# Patient Record
Sex: MALE | Race: WHITE | NOT HISPANIC OR LATINO | Employment: OTHER | URBAN - METROPOLITAN AREA
[De-identification: names, ages, dates, MRNs, and addresses within clinical notes are randomized per-mention and may not be internally consistent; named-entity substitution may affect disease eponyms.]

---

## 2018-07-09 ENCOUNTER — APPOINTMENT (EMERGENCY)
Dept: CT IMAGING | Facility: HOSPITAL | Age: 68
End: 2018-07-09
Payer: MEDICARE

## 2018-07-09 ENCOUNTER — OFFICE VISIT (OUTPATIENT)
Dept: URGENT CARE | Facility: MEDICAL CENTER | Age: 68
End: 2018-07-09
Payer: MEDICARE

## 2018-07-09 ENCOUNTER — HOSPITAL ENCOUNTER (OUTPATIENT)
Facility: HOSPITAL | Age: 68
Setting detail: OBSERVATION
Discharge: HOME | End: 2018-07-10
Attending: EMERGENCY MEDICINE | Admitting: HOSPITALIST
Payer: MEDICARE

## 2018-07-09 VITALS
WEIGHT: 238.6 LBS | DIASTOLIC BLOOD PRESSURE: 78 MMHG | SYSTOLIC BLOOD PRESSURE: 110 MMHG | RESPIRATION RATE: 20 BRPM | OXYGEN SATURATION: 96 % | HEART RATE: 92 BPM | TEMPERATURE: 98.6 F

## 2018-07-09 DIAGNOSIS — R31.9 HEMATURIA, UNSPECIFIED TYPE: Primary | ICD-10-CM

## 2018-07-09 DIAGNOSIS — N39.0 UTI (URINARY TRACT INFECTION): ICD-10-CM

## 2018-07-09 DIAGNOSIS — N20.0 NEPHROLITHIASIS: ICD-10-CM

## 2018-07-09 DIAGNOSIS — N20.1 OBSTRUCTION OF LEFT URETEROPELVIC JUNCTION (UPJ) DUE TO STONE: Primary | ICD-10-CM

## 2018-07-09 DIAGNOSIS — M71.30 SYNOVIAL CYST: ICD-10-CM

## 2018-07-09 PROBLEM — K21.9 GERD (GASTROESOPHAGEAL REFLUX DISEASE): Status: ACTIVE | Noted: 2018-07-09

## 2018-07-09 PROBLEM — E11.9 TYPE 2 DIABETES MELLITUS (HCC): Status: ACTIVE | Noted: 2018-07-09

## 2018-07-09 PROBLEM — G47.33 OSA (OBSTRUCTIVE SLEEP APNEA): Status: ACTIVE | Noted: 2018-07-09

## 2018-07-09 PROBLEM — I82.409 DEEP VEIN THROMBOSIS (DVT) DURING CURRENT HOSPITALIZATION (HCC): Status: ACTIVE | Noted: 2018-07-09

## 2018-07-09 PROBLEM — I48.0 PAROXYSMAL A-FIB (HCC): Status: ACTIVE | Noted: 2018-07-09

## 2018-07-09 PROBLEM — R82.90 URINE ABNORMALITY: Status: ACTIVE | Noted: 2018-07-09

## 2018-07-09 PROBLEM — I71.2 THORACIC AORTIC ANEURYSM (HCC): Status: ACTIVE | Noted: 2018-07-09

## 2018-07-09 LAB
ALBUMIN SERPL BCP-MCNC: 3.5 G/DL (ref 3.5–5)
ALP SERPL-CCNC: 107 U/L (ref 46–116)
ALT SERPL W P-5'-P-CCNC: 24 U/L (ref 12–78)
ANION GAP SERPL CALCULATED.3IONS-SCNC: 12 MMOL/L (ref 4–13)
APTT PPP: 36 SECONDS (ref 24–36)
AST SERPL W P-5'-P-CCNC: 18 U/L (ref 5–45)
BACTERIA UR QL AUTO: ABNORMAL /HPF
BASOPHILS # BLD AUTO: 0.03 THOUSANDS/ΜL (ref 0–0.1)
BASOPHILS NFR BLD AUTO: 0 % (ref 0–1)
BILIRUB SERPL-MCNC: 1.3 MG/DL (ref 0.2–1)
BILIRUB UR QL STRIP: ABNORMAL
BUN SERPL-MCNC: 23 MG/DL (ref 5–25)
CALCIUM SERPL-MCNC: 8.6 MG/DL (ref 8.3–10.1)
CHLORIDE SERPL-SCNC: 106 MMOL/L (ref 100–108)
CLARITY UR: ABNORMAL
CO2 SERPL-SCNC: 22 MMOL/L (ref 21–32)
COLOR UR: ABNORMAL
CREAT SERPL-MCNC: 1.2 MG/DL (ref 0.6–1.3)
EOSINOPHIL # BLD AUTO: 0.1 THOUSAND/ΜL (ref 0–0.61)
EOSINOPHIL NFR BLD AUTO: 1 % (ref 0–6)
ERYTHROCYTE [DISTWIDTH] IN BLOOD BY AUTOMATED COUNT: 13 % (ref 11.6–15.1)
GFR SERPL CREATININE-BSD FRML MDRD: 62 ML/MIN/1.73SQ M
GLUCOSE SERPL-MCNC: 141 MG/DL (ref 65–140)
GLUCOSE SERPL-MCNC: 263 MG/DL (ref 65–140)
GLUCOSE UR STRIP-MCNC: NEGATIVE MG/DL
HCT VFR BLD AUTO: 47.7 % (ref 36.5–49.3)
HGB BLD-MCNC: 16.4 G/DL (ref 12–17)
HGB UR QL STRIP.AUTO: ABNORMAL
INR PPP: 1.83 (ref 0.86–1.17)
KETONES UR STRIP-MCNC: NEGATIVE MG/DL
LEUKOCYTE ESTERASE UR QL STRIP: NEGATIVE
LYMPHOCYTES # BLD AUTO: 2.41 THOUSANDS/ΜL (ref 0.6–4.47)
LYMPHOCYTES NFR BLD AUTO: 30 % (ref 14–44)
MCH RBC QN AUTO: 30.9 PG (ref 26.8–34.3)
MCHC RBC AUTO-ENTMCNC: 34.4 G/DL (ref 31.4–37.4)
MCV RBC AUTO: 90 FL (ref 82–98)
MONOCYTES # BLD AUTO: 0.69 THOUSAND/ΜL (ref 0.17–1.22)
MONOCYTES NFR BLD AUTO: 9 % (ref 4–12)
NEUTROPHILS # BLD AUTO: 4.81 THOUSANDS/ΜL (ref 1.85–7.62)
NEUTS SEG NFR BLD AUTO: 60 % (ref 43–75)
NITRITE UR QL STRIP: POSITIVE
NON-SQ EPI CELLS URNS QL MICRO: ABNORMAL /HPF
PH UR STRIP.AUTO: 5 [PH] (ref 4.5–8)
PLATELET # BLD AUTO: 199 THOUSANDS/UL (ref 149–390)
PMV BLD AUTO: 9.7 FL (ref 8.9–12.7)
POTASSIUM SERPL-SCNC: 4.2 MMOL/L (ref 3.5–5.3)
PROT SERPL-MCNC: 7.5 G/DL (ref 6.4–8.2)
PROT UR STRIP-MCNC: ABNORMAL MG/DL
PROTHROMBIN TIME: 20.6 SECONDS (ref 11.8–14.2)
RBC # BLD AUTO: 5.3 MILLION/UL (ref 3.88–5.62)
RBC #/AREA URNS AUTO: ABNORMAL /HPF
SL AMB  POCT GLUCOSE, UA: ABNORMAL
SL AMB LEUKOCYTE ESTERASE,UA: ABNORMAL
SL AMB POCT BILIRUBIN,UA: ABNORMAL
SL AMB POCT BLOOD,UA: ABNORMAL
SL AMB POCT CLARITY,UA: ABNORMAL
SL AMB POCT COLOR,UA: ABNORMAL
SL AMB POCT KETONES,UA: ABNORMAL
SL AMB POCT NITRITE,UA: ABNORMAL
SL AMB POCT PH,UA: 6
SL AMB POCT SPECIFIC GRAVITY,UA: 1.03
SL AMB POCT URINE PROTEIN: 100
SL AMB POCT UROBILINOGEN: 0.2
SODIUM SERPL-SCNC: 140 MMOL/L (ref 136–145)
SP GR UR STRIP.AUTO: >=1.03 (ref 1–1.03)
UROBILINOGEN UR QL STRIP.AUTO: 0.2 E.U./DL
WBC # BLD AUTO: 8.04 THOUSAND/UL (ref 4.31–10.16)
WBC #/AREA URNS AUTO: ABNORMAL /HPF

## 2018-07-09 PROCEDURE — 99203 OFFICE O/P NEW LOW 30 MIN: CPT | Performed by: PHYSICIAN ASSISTANT

## 2018-07-09 PROCEDURE — 96361 HYDRATE IV INFUSION ADD-ON: CPT

## 2018-07-09 PROCEDURE — 99219 PR INITIAL OBSERVATION CARE/DAY 50 MINUTES: CPT | Performed by: PHYSICIAN ASSISTANT

## 2018-07-09 PROCEDURE — 74177 CT ABD & PELVIS W/CONTRAST: CPT

## 2018-07-09 PROCEDURE — 81001 URINALYSIS AUTO W/SCOPE: CPT | Performed by: EMERGENCY MEDICINE

## 2018-07-09 PROCEDURE — 85025 COMPLETE CBC W/AUTO DIFF WBC: CPT | Performed by: EMERGENCY MEDICINE

## 2018-07-09 PROCEDURE — 87086 URINE CULTURE/COLONY COUNT: CPT | Performed by: EMERGENCY MEDICINE

## 2018-07-09 PROCEDURE — 85610 PROTHROMBIN TIME: CPT | Performed by: EMERGENCY MEDICINE

## 2018-07-09 PROCEDURE — 96360 HYDRATION IV INFUSION INIT: CPT

## 2018-07-09 PROCEDURE — 85730 THROMBOPLASTIN TIME PARTIAL: CPT | Performed by: EMERGENCY MEDICINE

## 2018-07-09 PROCEDURE — 99285 EMERGENCY DEPT VISIT HI MDM: CPT

## 2018-07-09 PROCEDURE — 82948 REAGENT STRIP/BLOOD GLUCOSE: CPT

## 2018-07-09 PROCEDURE — 36415 COLL VENOUS BLD VENIPUNCTURE: CPT | Performed by: EMERGENCY MEDICINE

## 2018-07-09 PROCEDURE — 80053 COMPREHEN METABOLIC PANEL: CPT | Performed by: EMERGENCY MEDICINE

## 2018-07-09 PROCEDURE — G0463 HOSPITAL OUTPT CLINIC VISIT: HCPCS | Performed by: PHYSICIAN ASSISTANT

## 2018-07-09 RX ORDER — WARFARIN SODIUM 10 MG/1
10 TABLET ORAL 2 TIMES WEEKLY
COMMUNITY

## 2018-07-09 RX ORDER — SODIUM CHLORIDE 9 MG/ML
125 INJECTION, SOLUTION INTRAVENOUS CONTINUOUS
Status: DISCONTINUED | OUTPATIENT
Start: 2018-07-09 | End: 2018-07-10 | Stop reason: HOSPADM

## 2018-07-09 RX ORDER — POLYETHYLENE GLYCOL 3350 17 G/17G
17 POWDER, FOR SOLUTION ORAL DAILY PRN
Status: DISCONTINUED | OUTPATIENT
Start: 2018-07-09 | End: 2018-07-10 | Stop reason: HOSPADM

## 2018-07-09 RX ORDER — GLIPIZIDE 5 MG/1
5 TABLET ORAL EVERY MORNING
COMMUNITY

## 2018-07-09 RX ORDER — LEVOFLOXACIN 5 MG/ML
750 INJECTION, SOLUTION INTRAVENOUS EVERY 24 HOURS
Status: DISCONTINUED | OUTPATIENT
Start: 2018-07-10 | End: 2018-07-10 | Stop reason: HOSPADM

## 2018-07-09 RX ORDER — ONDANSETRON 2 MG/ML
4 INJECTION INTRAMUSCULAR; INTRAVENOUS EVERY 6 HOURS PRN
Status: DISCONTINUED | OUTPATIENT
Start: 2018-07-09 | End: 2018-07-10 | Stop reason: HOSPADM

## 2018-07-09 RX ORDER — METOPROLOL SUCCINATE 25 MG/1
25 TABLET, EXTENDED RELEASE ORAL
COMMUNITY

## 2018-07-09 RX ORDER — ACETAMINOPHEN 325 MG/1
650 TABLET ORAL EVERY 6 HOURS PRN
Status: DISCONTINUED | OUTPATIENT
Start: 2018-07-09 | End: 2018-07-10 | Stop reason: HOSPADM

## 2018-07-09 RX ORDER — LEVOFLOXACIN 5 MG/ML
750 INJECTION, SOLUTION INTRAVENOUS ONCE
Status: COMPLETED | OUTPATIENT
Start: 2018-07-09 | End: 2018-07-09

## 2018-07-09 RX ORDER — METOPROLOL SUCCINATE 25 MG/1
25 TABLET, EXTENDED RELEASE ORAL DAILY
Status: DISCONTINUED | OUTPATIENT
Start: 2018-07-10 | End: 2018-07-10 | Stop reason: HOSPADM

## 2018-07-09 RX ORDER — SODIUM CHLORIDE 9 MG/ML
125 INJECTION, SOLUTION INTRAVENOUS CONTINUOUS
Status: DISCONTINUED | OUTPATIENT
Start: 2018-07-09 | End: 2018-07-09 | Stop reason: HOSPADM

## 2018-07-09 RX ORDER — VALSARTAN 80 MG/1
40 TABLET ORAL DAILY
Status: DISCONTINUED | OUTPATIENT
Start: 2018-07-10 | End: 2018-07-10 | Stop reason: HOSPADM

## 2018-07-09 RX ORDER — WARFARIN SODIUM 5 MG/1
5 TABLET ORAL
COMMUNITY

## 2018-07-09 RX ORDER — LOSARTAN POTASSIUM 25 MG/1
25 TABLET ORAL EVERY MORNING
COMMUNITY
End: 2018-07-26

## 2018-07-09 RX ADMIN — SODIUM CHLORIDE 1000 ML: 0.9 INJECTION, SOLUTION INTRAVENOUS at 19:22

## 2018-07-09 RX ADMIN — LEVOFLOXACIN 750 MG: 5 INJECTION, SOLUTION INTRAVENOUS at 21:49

## 2018-07-09 RX ADMIN — IOHEXOL 100 ML: 350 INJECTION, SOLUTION INTRAVENOUS at 20:01

## 2018-07-09 RX ADMIN — SODIUM CHLORIDE 125 ML/HR: 0.9 INJECTION, SOLUTION INTRAVENOUS at 21:49

## 2018-07-09 NOTE — PROGRESS NOTES
[address]        NAME: López Kaur is a 80 y o  male  : 1950    MRN: [id number]  DATE: 2018  TIME: 5:51 PM    Assessment and Plan   Hematuria, unspecified type [R31 9]  1  Hematuria, unspecified type           Patient Instructions     Patient Instructions   Patient sent to the ER for further evaluation workup could not be performed our facility  Patient needs PT INR  Patient is stable to go by car  Patient is going to [facility]  Follow up with PCP in 3-5 days  Proceed to  ER if symptoms worsen  Chief Complaint     Chief Complaint   Patient presents with    Possible UTI    Testicle Pain         History of Present Illness       This is a 67-year-old male complaining of brown colored urine x8 hours  Patient reports his last INR was 3 weeks ago and it was 2 7  Patient reports he has not changed his dose of Coumadin in the past 3 weeks  Patient denies any bleeding of nose mouth or any other orifices  Patient denies any chest pain shortness of breath difficulty breathing, painful urination or discharge  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 with similar color urine before widening  Testicle Pain   Pertinent negatives include no chest pain, chills, fever, rash or shortness of breath  Review of Systems   Review of Systems   Constitutional: Negative for chills and fever  HENT: Negative  Eyes: Negative  Respiratory: Negative for chest tightness, shortness of breath and wheezing  Cardiovascular: Negative for chest pain and palpitations  Genitourinary:        Brown color urine     Skin: Negative for rash           Current Medications       Current Outpatient Prescriptions:     losartan (COZAAR) 25 mg tablet, Take 25 mg by mouth daily, Disp: , Rfl:     metoprolol succinate (TOPROL-XL) 25 mg 24 hr tablet, Take 25 mg by mouth daily, Disp: , Rfl:     warfarin (COUMADIN) 10 mg tablet, Take 10 mg by mouth 2 (two) times a week, Disp: , Rfl:    warfarin (COUMADIN) 5 mg tablet, Take 5 mg by mouth 4 (four) times a week, Disp: , Rfl:     Current Allergies     Allergies as of 07/09/2018 - Reviewed 07/09/2018   Allergen Reaction Noted    Penicillins  07/09/2018            The following portions of the patient's history were reviewed and updated as appropriate: allergies, current medications, past family history, past medical history, past social history, past surgical history and problem list      Past Medical History:   Diagnosis Date    [facility]        No past surgical history on file  No family history on file  Medications have been verified  Objective   /78 (BP Location: Left arm, Patient Position: Sitting, Cuff Size: Large)   Pulse 92   Temp 98 6 °F (37 °C) (Temporal)   Resp 20   Wt 108 kg (238 lb 9 6 oz)   SpO2 96%        Physical Exam     Physical Exam   Constitutional: He is oriented to person, place, and time  He appears well-developed and well-nourished  HENT:   Right Ear: Tympanic membrane and external ear normal    Left Ear: Tympanic membrane and external ear normal    Eyes: EOM are normal    Neck: Normal range of motion  No edema present  Cardiovascular: Normal rate, regular rhythm, S1 normal, S2 normal and normal heart sounds  No murmur heard  Pulmonary/Chest: Effort normal and breath sounds normal  No respiratory distress  He has no wheezes  He has no rales  He exhibits no tenderness  Lymphadenopathy:     He has no cervical adenopathy  Neurological: He is alert and oriented to person, place, and time  Skin: Skin is warm, dry and intact  No rash noted  Psychiatric: He has a normal mood and affect  His speech is normal and behavior is normal    Nursing note and vitals reviewed

## 2018-07-09 NOTE — ED PROVIDER NOTES
History  Chief Complaint   Patient presents with    Blood in Urine     Pt presents to the ED for evaluation of "brown urine" since today, pt gave sample to   Pt reports small blood in urine 1 month ago but did not follow up with urology  pt takes coumadin for Afib  Pt denies urinary symptoms or pain     28-year-old male presents from urgent care for evaluation of dark brown tea-colored urine  Patient noticed change in urine color approximately 8 to 10 hours ago  Patient states that he does void frequently and urgently at baseline  He has not noticed new urgency or frequency  Patient does take Coumadin  He had an INR checked approximately 3 weeks ago it was 2 7 at that time  He has a history of atrial fibrillation  Patient denies dysuria  No flank pain  Patient was found to have microscopic hematuria by his PCP a few weeks ago  He was referred to Urology but did not follow up yet because he had to come back to [place] because his father is dying  Patient resides in [place]  Patient denies fevers or chills  No nausea, vomiting, diarrhea  No history of liver disease  No history of renal disease  Patient does have a thoracic aortic aneurysm that is being followed by his cardiologist         History provided by:  Patient and medical records   used: No    Blood in Urine   This is a new problem  The current episode started today  The problem has been gradually worsening since onset  Severity: Brown colored urine with sediment, no clots  The hematuria occurs throughout his entire urinary stream  He reports no clotting in his urine stream  His pain is at a severity of 0/10  He is experiencing no pain  He describes his urine color as tea colored  Irritative symptoms include frequency and urgency  Obstructive symptoms do not include incomplete emptying or straining  Pertinent negatives include no abdominal pain, chills, flank pain, nausea or vomiting         Prior to Admission Medications   Prescriptions Last Dose Informant Patient Reported? Taking?   glipiZIDE (GLUCOTROL) 5 mg tablet Past Week at Unknown time  Yes Yes   Sig: Take 5 mg by mouth daily   losartan (COZAAR) 25 mg tablet Past Week at Unknown time  Yes Yes   Sig: Take 25 mg by mouth daily   metoprolol succinate (TOPROL-XL) 25 mg 24 hr tablet Past Week at Unknown time  Yes Yes   Sig: Take 25 mg by mouth daily   warfarin (COUMADIN) 10 mg tablet Past Week at Unknown time  Yes Yes   Sig: Take 10 mg by mouth 2 (two) times a week   warfarin (COUMADIN) 5 mg tablet 7/9/2018 at Unknown time  Yes Yes   Sig: Take 5 mg by mouth 4 (four) times a week      Facility-Administered Medications: None       Past Medical History:   Diagnosis Date    [facility])     [facility] ([facility] )        Past Surgical History:   Procedure Laterality Date    NM CYSTOURETHROSCOPY,URETER CATHETER Left 7/10/2018    Procedure: CYSTOSCOPY RETROGRADE PYELOGRAM WITH INSERTION STENT URETERAL;  Surgeon: Nina Elias MD;  Location: [facility];  Service: Urology       History reviewed  No pertinent family history  I have reviewed and agree with the history as documented  Social History   Substance Use Topics    Smoking status: Never Smoker    Smokeless tobacco: Never Used    Alcohol use Yes      Comment: occ        Review of Systems   Constitutional: Negative for chills  Gastrointestinal: Negative for abdominal pain, nausea and vomiting  Genitourinary: Positive for frequency, hematuria and urgency  Negative for flank pain and incomplete emptying  All other systems reviewed and are negative  Physical Exam  Physical Exam   Constitutional: He is oriented to person, place, and time  Vital signs are normal  He appears well-developed and well-nourished  No distress  HENT:   Head: Normocephalic and atraumatic  Eyes: Conjunctivae and EOM are normal  Pupils are equal, round, and reactive to light  Neck: Normal range of motion  Neck supple  Cardiovascular: Normal rate, regular rhythm, normal heart sounds and intact distal pulses  Pulmonary/Chest: Effort normal and breath sounds normal  No accessory muscle usage  No respiratory distress  He exhibits no tenderness  Abdominal: Soft  Normal appearance and bowel sounds are normal  He exhibits no distension  There is tenderness in the left lower quadrant  There is no rebound, no guarding and no CVA tenderness  Obese abdomen   Musculoskeletal: Normal range of motion  He exhibits no edema, tenderness or deformity  Lymphadenopathy:     He has no cervical adenopathy  Neurological: He is alert and oriented to person, place, and time  He has normal strength and normal reflexes  Coordination normal    Skin: Skin is warm, dry and intact  No rash noted  Psychiatric: He has a normal mood and affect  His behavior is normal  Judgment and thought content normal    Vitals reviewed  Vital Signs  ED Triage Vitals [07/09/18 1830]   Temperature Pulse Respirations Blood Pressure SpO2   98 2 °F (36 8 °C) 98 20 130/81 96 %      Temp Source Heart Rate Source Patient Position - Orthostatic VS BP Location FiO2 (%)   Oral Monitor Sitting Right arm --      Pain Score       No Pain           Vitals:    07/10/18 0815 07/10/18 0830 07/10/18 0839 07/10/18 0855   BP: 122/77 122/71 142/72 151/79   Pulse: 70 68 72 71   Patient Position - Orthostatic VS:    Lying       Visual Acuity      ED Medications  Medications   sodium chloride 0 9 % bolus 1,000 mL (0 mL Intravenous Stopped 7/9/18 2128)   iohexol (OMNIPAQUE) 350 MG/ML injection (MULTI-DOSE) 100 mL (100 mL Intravenous Given 7/9/18 2001)   levofloxacin (LEVAQUIN) IVPB (premix) 750 mg (750 mg Intravenous New Bag 7/9/18 2149)       Diagnostic Studies  Results Reviewed     Procedure Component Value Units Date/Time    Urine culture [[id number]] Collected:  07/09/18 2141    Lab Status:   In process Specimen:  Urine from Urine, Clean Catch Updated:  07/09/18 2149    Urine Microscopic [[id number]]  (Abnormal) Collected:  07/09/18 1919    Lab Status:  Final result Specimen:  Urine from Urine, Clean Catch Updated:  07/09/18 1954     RBC, UA       Field obscured, unable to enumerate (A)     /hpf     WBC, UA 1-2 (A) /hpf      Epithelial Cells Occasional /hpf      Bacteria, UA Occasional /hpf     UA w Reflex to Microscopic w Reflex to Culture [[id number]]  (Abnormal) Collected:  07/09/18 1919    Lab Status:  Final result Specimen:  Urine from Urine, Clean Catch Updated:  07/09/18 1948     Color, UA Red     Clarity, UA Cloudy     Specific Gravity, UA >=1 030     pH, UA 5 0     Leukocytes, UA Negative     Nitrite, UA Positive (A)     Protein,  (2+) (A) mg/dl      Glucose, UA Negative mg/dl      Ketones, UA Negative mg/dl      Urobilinogen, UA 0 2 E U /dl      Bilirubin, UA Small (A)     Blood, UA Large (A)    Protime-INR [[id number]]  (Abnormal) Collected:  07/09/18 1918    Lab Status:  Final result Specimen:  Blood from Arm, Left Updated:  07/09/18 1948     Protime 20 6 (H) seconds      INR 1 83 (H)    APTT [[id number]]  (Normal) Collected:  07/09/18 1918    Lab Status:  Final result Specimen:  Blood from Arm, Left Updated:  07/09/18 1948     PTT 36 seconds     Comprehensive metabolic panel [[id number]]  (Abnormal) Collected:  07/09/18 1918    Lab Status:  Final result Specimen:  Blood from Arm, Left Updated:  07/09/18 1947     Sodium 140 mmol/L      Potassium 4 2 mmol/L      Chloride 106 mmol/L      CO2 22 mmol/L      Anion Gap 12 mmol/L      BUN 23 mg/dL      Creatinine 1 20 mg/dL      Glucose 141 (H) mg/dL      Calcium 8 6 mg/dL      AST 18 U/L      ALT 24 U/L      Alkaline Phosphatase 107 U/L      Total Protein 7 5 g/dL      Albumin 3 5 g/dL      Total Bilirubin 1 30 (H) mg/dL      eGFR 62 ml/min/1 73sq m     Narrative:         National Kidney Disease Education Program recommendations are as follows:  GFR calculation is accurate only with a steady state creatinine  Chronic Kidney disease less than 60 ml/min/1 73 sq  meters  Kidney failure less than 15 ml/min/1 73 sq  meters  CBC and differential [[id number]]  (Normal) Collected:  07/09/18 1918    Lab Status:  Final result Specimen:  Blood from Arm, Left Updated:  07/09/18 1931     WBC 8 04 Thousand/uL      RBC 5 30 Million/uL      Hemoglobin 16 4 g/dL      Hematocrit 47 7 %      MCV 90 fL      MCH 30 9 pg      MCHC 34 4 g/dL      RDW 13 0 %      MPV 9 7 fL      Platelets 967 Thousands/uL      Neutrophils Relative 60 %      Lymphocytes Relative 30 %      Monocytes Relative 9 %      Eosinophils Relative 1 %      Basophils Relative 0 %      Neutrophils Absolute 4 81 Thousands/µL      Lymphocytes Absolute 2 41 Thousands/µL      Monocytes Absolute 0 69 Thousand/µL      Eosinophils Absolute 0 10 Thousand/µL      Basophils Absolute 0 03 Thousands/µL                  CT abdomen pelvis with contrast   Final Result by oCrry Goode DO (07/09 2041)   Ascending thoracic aortic aneurysm (2 5 cm)      Fatty infiltration of liver  Cholelithiasis without CT evidence of cholecystitis  Diffuse fatty atrophy of pancreas  Bilateral renal cortical cystic lesions, many of which are too small to accurately characterize  Left side renal collecting system stones and a 9 x 6 x 3 mm stone in the left UPJ causing at least partial obstruction with a delayed nephrogram and hydronephrosis  Urologic consultation recommended  Limited evaluation of GI tract without oral contrast   No obstruction or perforation  Colonic diverticulosis without evidence of diverticulitis  Ectasia of the abdominal aorta at the level of the MICHAEL (2 8 cm)  Multilevel fusion of lumbar spine  There is likely a large synovial cyst in the sacral spinal canal   Nonemergent MRI correlation recommended, especially if there are neurologic symptoms           Workstation performed: [id number]         FL retrograde pyelogram    (Results Pending) Procedures  Procedures       Phone Contacts  ED Phone Contact    ED Course                               MDM  Number of Diagnoses or Management Options  Obstruction of left ureteropelvic junction (UPJ) due to stone: new and requires workup  Synovial cyst: new and requires workup  UTI (urinary tract infection): new and requires workup     Amount and/or Complexity of Data Reviewed  Clinical lab tests: ordered and reviewed  Tests in the radiology section of CPT®: ordered and reviewed  Decide to obtain previous medical records or to obtain history from someone other than the patient: yes  Discuss the patient with other providers: yes (Dr Cristian Majano [facility] - will see in consult)  Independent visualization of images, tracings, or specimens: yes    Patient Progress  Patient progress: stable    CritCare Time    Disposition  Final diagnoses:   Obstruction of left ureteropelvic junction (UPJ) due to stone   UTI (urinary tract infection)   Synovial cyst - sacral canal     Time reflects when diagnosis was documented in both MDM as applicable and the Disposition within this note     Time User Action Codes Description Comment    7/9/2018  9:25 PM Corry Moses Add [N20 1] Obstruction of left ureteropelvic junction (UPJ) due to stone     7/9/2018  9:26 PM Corry Moses Add [N39 0] UTI (urinary tract infection)     7/10/2018  7:29 AM Vanessa Ramos Add [N20 0] Nephrolithiasis     7/10/2018  7:58 AM Sima Cristina Modify [N20 1] Obstruction of left ureteropelvic junction (UPJ) due to stone     7/10/2018  2:12 PM Pham Cortés Modify [N20 1] Obstruction of left ureteropelvic junction (UPJ) due to stone     7/11/2018 12:13 AM Corry Moses Add [M71 30] Synovial cyst     7/11/2018 12:13 AM Corry Moses Modify [M71 30] Synovial cyst sacral canal      ED Disposition     ED Disposition Condition Comment    Admit  Case was discussed with Dr Sandra Calix and the patient's admission status was agreed to be Admission Status: inpatient status to the service of Dr Flores Holding   Follow-up Information     Follow up With Specialties Details Why Contact Info    Jelani Parrish MD Urology Follow up on 7/24/2018  [address] [facility]            Discharge Medication List as of 7/10/2018  2:18 PM      START taking these medications    Details   docusate sodium (COLACE) 100 mg capsule Take 1 capsule (100 mg total) by mouth 3 (three) times a day for 30 days, Starting Tue 7/10/2018, Until Thu 8/9/2018, Print         CONTINUE these medications which have CHANGED    Details   nitrofurantoin (MACROBID) 100 mg capsule Take 1 capsule (100 mg total) by mouth 2 (two) times a day for 5 days, Starting Tue 7/10/2018, Until Sun 7/15/2018, Print      oxybutynin (DITROPAN) 5 mg tablet Take 1 tablet (5 mg total) by mouth 2 (two) times a day for 90 days, Starting Tue 7/10/2018, Until Mon 10/8/2018, Print      oxyCODONE-acetaminophen (PERCOCET) 5-325 mg per tablet Take 2 tablets by mouth every 6 (six) hours as needed for moderate pain for up to 10 days Max Daily Amount: 8 tablets, Starting Tue 7/10/2018, Until Fri 7/20/2018, Print      tamsulosin (FLOMAX) 0 4 mg Take 1 capsule (0 4 mg total) by mouth daily with dinner, Starting Tue 7/10/2018, Print         CONTINUE these medications which have NOT CHANGED    Details   glipiZIDE (GLUCOTROL) 5 mg tablet Take 5 mg by mouth daily, Historical Med      losartan (COZAAR) 25 mg tablet Take 25 mg by mouth daily, Historical Med      metoprolol succinate (TOPROL-XL) 25 mg 24 hr tablet Take 25 mg by mouth daily, Historical Med      !! warfarin (COUMADIN) 10 mg tablet Take 10 mg by mouth 2 (two) times a week, Historical Med      !! warfarin (COUMADIN) 5 mg tablet Take 5 mg by mouth 4 (four) times a week, Historical Med       !! - Potential duplicate medications found  Please discuss with provider            Outpatient Discharge Orders  Discharge Diet     Activity as tolerated     Call provider for:  severe uncontrolled pain     Call provider for:         ED Provider  Electronically Signed by           Alonso Holden DO  07/11/18 4836

## 2018-07-09 NOTE — PROGRESS NOTES
Pt states his urine is brown today  Also c/o left testicle discomfort  Pt prescribed Losartan 25 mg on 5-24-18  Pt has A-fib with an irregular heart beat and is on Warfarin

## 2018-07-09 NOTE — PATIENT INSTRUCTIONS
Patient sent to the ER for further evaluation workup could not be performed our facility  Patient needs PT INR  Patient is stable to go by car  Patient is going to [facility]

## 2018-07-10 ENCOUNTER — ANESTHESIA (OUTPATIENT)
Dept: PERIOP | Facility: HOSPITAL | Age: 68
End: 2018-07-10
Payer: MEDICARE

## 2018-07-10 ENCOUNTER — TELEPHONE (OUTPATIENT)
Dept: UROLOGY | Facility: CLINIC | Age: 68
End: 2018-07-10

## 2018-07-10 ENCOUNTER — PREP FOR PROCEDURE (OUTPATIENT)
Dept: UROLOGY | Facility: CLINIC | Age: 68
End: 2018-07-10

## 2018-07-10 ENCOUNTER — APPOINTMENT (OUTPATIENT)
Dept: RADIOLOGY | Facility: HOSPITAL | Age: 68
End: 2018-07-10
Payer: MEDICARE

## 2018-07-10 ENCOUNTER — ANESTHESIA EVENT (OUTPATIENT)
Dept: PERIOP | Facility: HOSPITAL | Age: 68
End: 2018-07-10
Payer: MEDICARE

## 2018-07-10 VITALS
HEART RATE: 71 BPM | RESPIRATION RATE: 19 BRPM | BODY MASS INDEX: 35.25 KG/M2 | OXYGEN SATURATION: 95 % | SYSTOLIC BLOOD PRESSURE: 151 MMHG | DIASTOLIC BLOOD PRESSURE: 79 MMHG | HEIGHT: 69 IN | WEIGHT: 238 LBS | TEMPERATURE: 97.9 F

## 2018-07-10 DIAGNOSIS — N20.0 NEPHROLITHIASIS: Primary | ICD-10-CM

## 2018-07-10 PROBLEM — N20.1 OBSTRUCTION OF LEFT URETEROPELVIC JUNCTION (UPJ) DUE TO STONE: Status: ACTIVE | Noted: 2018-07-09

## 2018-07-10 LAB
ANION GAP SERPL CALCULATED.3IONS-SCNC: 11 MMOL/L (ref 4–13)
APTT PPP: 37 SECONDS (ref 24–36)
ATRIAL RATE: 76 BPM
BASOPHILS # BLD AUTO: 0.01 THOUSANDS/ΜL (ref 0–0.1)
BASOPHILS NFR BLD AUTO: 0 % (ref 0–1)
BUN SERPL-MCNC: 22 MG/DL (ref 5–25)
CALCIUM SERPL-MCNC: 7.9 MG/DL (ref 8.3–10.1)
CHLORIDE SERPL-SCNC: 108 MMOL/L (ref 100–108)
CO2 SERPL-SCNC: 21 MMOL/L (ref 21–32)
CREAT SERPL-MCNC: 1.08 MG/DL (ref 0.6–1.3)
EOSINOPHIL # BLD AUTO: 0.11 THOUSAND/ΜL (ref 0–0.61)
EOSINOPHIL NFR BLD AUTO: 2 % (ref 0–6)
ERYTHROCYTE [DISTWIDTH] IN BLOOD BY AUTOMATED COUNT: 12.9 % (ref 11.6–15.1)
EST. AVERAGE GLUCOSE BLD GHB EST-MCNC: 163 MG/DL
GFR SERPL CREATININE-BSD FRML MDRD: 71 ML/MIN/1.73SQ M
GLUCOSE SERPL-MCNC: 146 MG/DL (ref 65–140)
GLUCOSE SERPL-MCNC: 154 MG/DL (ref 65–140)
GLUCOSE SERPL-MCNC: 186 MG/DL (ref 65–140)
HBA1C MFR BLD: 7.3 % (ref 4.2–6.3)
HCT VFR BLD AUTO: 44.3 % (ref 36.5–49.3)
HGB BLD-MCNC: 15.2 G/DL (ref 12–17)
INR PPP: 1.91 (ref 0.86–1.17)
LYMPHOCYTES # BLD AUTO: 2.29 THOUSANDS/ΜL (ref 0.6–4.47)
LYMPHOCYTES NFR BLD AUTO: 34 % (ref 14–44)
MCH RBC QN AUTO: 30.9 PG (ref 26.8–34.3)
MCHC RBC AUTO-ENTMCNC: 34.3 G/DL (ref 31.4–37.4)
MCV RBC AUTO: 90 FL (ref 82–98)
MONOCYTES # BLD AUTO: 0.7 THOUSAND/ΜL (ref 0.17–1.22)
MONOCYTES NFR BLD AUTO: 10 % (ref 4–12)
NEUTROPHILS # BLD AUTO: 3.6 THOUSANDS/ΜL (ref 1.85–7.62)
NEUTS SEG NFR BLD AUTO: 54 % (ref 43–75)
P AXIS: 34 DEGREES
PLATELET # BLD AUTO: 180 THOUSANDS/UL (ref 149–390)
PMV BLD AUTO: 9.2 FL (ref 8.9–12.7)
POTASSIUM SERPL-SCNC: 4.4 MMOL/L (ref 3.5–5.3)
PR INTERVAL: 182 MS
PROTHROMBIN TIME: 21.3 SECONDS (ref 11.8–14.2)
QRS AXIS: 10 DEGREES
QRSD INTERVAL: 86 MS
QT INTERVAL: 422 MS
QTC INTERVAL: 465 MS
RBC # BLD AUTO: 4.92 MILLION/UL (ref 3.88–5.62)
SODIUM SERPL-SCNC: 140 MMOL/L (ref 136–145)
T WAVE AXIS: 65 DEGREES
VENTRICULAR RATE: 73 BPM
WBC # BLD AUTO: 6.71 THOUSAND/UL (ref 4.31–10.16)

## 2018-07-10 PROCEDURE — 80048 BASIC METABOLIC PNL TOTAL CA: CPT | Performed by: PHYSICIAN ASSISTANT

## 2018-07-10 PROCEDURE — 82948 REAGENT STRIP/BLOOD GLUCOSE: CPT

## 2018-07-10 PROCEDURE — 93010 ELECTROCARDIOGRAM REPORT: CPT | Performed by: INTERNAL MEDICINE

## 2018-07-10 PROCEDURE — 99217 PR OBSERVATION CARE DISCHARGE MANAGEMENT: CPT | Performed by: INTERNAL MEDICINE

## 2018-07-10 PROCEDURE — 85025 COMPLETE CBC W/AUTO DIFF WBC: CPT | Performed by: PHYSICIAN ASSISTANT

## 2018-07-10 PROCEDURE — 85730 THROMBOPLASTIN TIME PARTIAL: CPT | Performed by: PHYSICIAN ASSISTANT

## 2018-07-10 PROCEDURE — 99235 HOSP IP/OBS SAME DATE MOD 70: CPT | Performed by: UROLOGY

## 2018-07-10 PROCEDURE — C1769 GUIDE WIRE: HCPCS | Performed by: UROLOGY

## 2018-07-10 PROCEDURE — 85610 PROTHROMBIN TIME: CPT | Performed by: PHYSICIAN ASSISTANT

## 2018-07-10 PROCEDURE — 83036 HEMOGLOBIN GLYCOSYLATED A1C: CPT | Performed by: PHYSICIAN ASSISTANT

## 2018-07-10 PROCEDURE — 87086 URINE CULTURE/COLONY COUNT: CPT | Performed by: UROLOGY

## 2018-07-10 PROCEDURE — 93005 ELECTROCARDIOGRAM TRACING: CPT

## 2018-07-10 PROCEDURE — 74420 UROGRAPHY RTRGR +-KUB: CPT

## 2018-07-10 PROCEDURE — 52332 CYSTOSCOPY AND TREATMENT: CPT | Performed by: UROLOGY

## 2018-07-10 PROCEDURE — C2617 STENT, NON-COR, TEM W/O DEL: HCPCS | Performed by: UROLOGY

## 2018-07-10 DEVICE — STENT URETERAL 6 FR 26CM INLAY OPTIMA: Type: IMPLANTABLE DEVICE | Site: URETER | Status: FUNCTIONAL

## 2018-07-10 RX ORDER — OXYCODONE HYDROCHLORIDE AND ACETAMINOPHEN 5; 325 MG/1; MG/1
2 TABLET ORAL EVERY 6 HOURS PRN
Qty: 20 TABLET | Refills: 0 | Status: SHIPPED | OUTPATIENT
Start: 2018-07-10 | End: 2018-07-10

## 2018-07-10 RX ORDER — SODIUM CHLORIDE 9 MG/ML
100 INJECTION, SOLUTION INTRAVENOUS CONTINUOUS
Status: DISCONTINUED | OUTPATIENT
Start: 2018-07-10 | End: 2018-07-10 | Stop reason: HOSPADM

## 2018-07-10 RX ORDER — LIDOCAINE HYDROCHLORIDE 10 MG/ML
INJECTION, SOLUTION INFILTRATION; PERINEURAL AS NEEDED
Status: DISCONTINUED | OUTPATIENT
Start: 2018-07-10 | End: 2018-07-10 | Stop reason: SURG

## 2018-07-10 RX ORDER — NITROFURANTOIN 25; 75 MG/1; MG/1
100 CAPSULE ORAL 2 TIMES DAILY
Qty: 10 CAPSULE | Refills: 0 | Status: SHIPPED | OUTPATIENT
Start: 2018-07-10 | End: 2018-07-15

## 2018-07-10 RX ORDER — FENTANYL CITRATE 50 UG/ML
INJECTION, SOLUTION INTRAMUSCULAR; INTRAVENOUS AS NEEDED
Status: DISCONTINUED | OUTPATIENT
Start: 2018-07-10 | End: 2018-07-10 | Stop reason: SURG

## 2018-07-10 RX ORDER — OXYBUTYNIN CHLORIDE 5 MG/1
5 TABLET ORAL 3 TIMES DAILY PRN
Status: DISCONTINUED | OUTPATIENT
Start: 2018-07-10 | End: 2018-07-10 | Stop reason: HOSPADM

## 2018-07-10 RX ORDER — ONDANSETRON 2 MG/ML
INJECTION INTRAMUSCULAR; INTRAVENOUS AS NEEDED
Status: DISCONTINUED | OUTPATIENT
Start: 2018-07-10 | End: 2018-07-10 | Stop reason: SURG

## 2018-07-10 RX ORDER — OXYCODONE HYDROCHLORIDE AND ACETAMINOPHEN 5; 325 MG/1; MG/1
2 TABLET ORAL EVERY 6 HOURS PRN
Status: DISCONTINUED | OUTPATIENT
Start: 2018-07-10 | End: 2018-07-10 | Stop reason: HOSPADM

## 2018-07-10 RX ORDER — LEVOFLOXACIN 5 MG/ML
750 INJECTION, SOLUTION INTRAVENOUS ONCE
Status: CANCELLED | OUTPATIENT
Start: 2018-07-24 | End: 2018-07-24

## 2018-07-10 RX ORDER — PROPOFOL 10 MG/ML
INJECTION, EMULSION INTRAVENOUS AS NEEDED
Status: DISCONTINUED | OUTPATIENT
Start: 2018-07-10 | End: 2018-07-10 | Stop reason: SURG

## 2018-07-10 RX ORDER — NITROFURANTOIN 25; 75 MG/1; MG/1
100 CAPSULE ORAL 2 TIMES DAILY
Qty: 10 CAPSULE | Refills: 0 | Status: SHIPPED | OUTPATIENT
Start: 2018-07-10 | End: 2018-07-10

## 2018-07-10 RX ORDER — OXYCODONE HYDROCHLORIDE AND ACETAMINOPHEN 5; 325 MG/1; MG/1
2 TABLET ORAL EVERY 6 HOURS PRN
Qty: 20 TABLET | Refills: 0 | Status: SHIPPED | OUTPATIENT
Start: 2018-07-10 | End: 2018-07-20

## 2018-07-10 RX ORDER — FENTANYL CITRATE/PF 50 MCG/ML
50 SYRINGE (ML) INJECTION
Status: DISCONTINUED | OUTPATIENT
Start: 2018-07-10 | End: 2018-07-10 | Stop reason: HOSPADM

## 2018-07-10 RX ORDER — MIDAZOLAM HYDROCHLORIDE 1 MG/ML
INJECTION INTRAMUSCULAR; INTRAVENOUS AS NEEDED
Status: DISCONTINUED | OUTPATIENT
Start: 2018-07-10 | End: 2018-07-10 | Stop reason: SURG

## 2018-07-10 RX ORDER — OXYBUTYNIN CHLORIDE 5 MG/1
5 TABLET ORAL 2 TIMES DAILY
Qty: 180 TABLET | Refills: 0 | Status: SHIPPED | OUTPATIENT
Start: 2018-07-10 | End: 2018-07-10

## 2018-07-10 RX ORDER — TAMSULOSIN HYDROCHLORIDE 0.4 MG/1
0.4 CAPSULE ORAL
Qty: 30 CAPSULE | Refills: 0 | Status: SHIPPED | OUTPATIENT
Start: 2018-07-10

## 2018-07-10 RX ORDER — OXYBUTYNIN CHLORIDE 5 MG/1
5 TABLET ORAL 2 TIMES DAILY
Qty: 180 TABLET | Refills: 0 | Status: SHIPPED | OUTPATIENT
Start: 2018-07-10 | End: 2018-10-08

## 2018-07-10 RX ORDER — ONDANSETRON 2 MG/ML
4 INJECTION INTRAMUSCULAR; INTRAVENOUS ONCE AS NEEDED
Status: DISCONTINUED | OUTPATIENT
Start: 2018-07-10 | End: 2018-07-10 | Stop reason: HOSPADM

## 2018-07-10 RX ORDER — MAGNESIUM HYDROXIDE 1200 MG/15ML
LIQUID ORAL AS NEEDED
Status: DISCONTINUED | OUTPATIENT
Start: 2018-07-10 | End: 2018-07-10 | Stop reason: HOSPADM

## 2018-07-10 RX ORDER — TAMSULOSIN HYDROCHLORIDE 0.4 MG/1
0.4 CAPSULE ORAL
Qty: 30 CAPSULE | Refills: 0 | Status: SHIPPED | OUTPATIENT
Start: 2018-07-10 | End: 2018-07-10

## 2018-07-10 RX ORDER — DOCUSATE SODIUM 100 MG/1
100 CAPSULE, LIQUID FILLED ORAL 3 TIMES DAILY
Qty: 90 CAPSULE | Refills: 0 | Status: SHIPPED | OUTPATIENT
Start: 2018-07-10 | End: 2018-07-26

## 2018-07-10 RX ADMIN — SODIUM CHLORIDE 100 ML/HR: 0.9 INJECTION, SOLUTION INTRAVENOUS at 09:39

## 2018-07-10 RX ADMIN — METOPROLOL SUCCINATE 25 MG: 25 TABLET, EXTENDED RELEASE ORAL at 09:38

## 2018-07-10 RX ADMIN — LIDOCAINE HYDROCHLORIDE 50 MG: 10 INJECTION, SOLUTION INFILTRATION; PERINEURAL at 07:47

## 2018-07-10 RX ADMIN — SODIUM CHLORIDE: 0.9 INJECTION, SOLUTION INTRAVENOUS at 07:34

## 2018-07-10 RX ADMIN — PROPOFOL 200 MG: 10 INJECTION, EMULSION INTRAVENOUS at 07:47

## 2018-07-10 RX ADMIN — MIDAZOLAM HYDROCHLORIDE 2 MG: 1 INJECTION, SOLUTION INTRAMUSCULAR; INTRAVENOUS at 07:42

## 2018-07-10 RX ADMIN — VALSARTAN 40 MG: 80 TABLET ORAL at 09:38

## 2018-07-10 RX ADMIN — FENTANYL CITRATE 50 MCG: 50 INJECTION INTRAMUSCULAR; INTRAVENOUS at 07:49

## 2018-07-10 RX ADMIN — SODIUM CHLORIDE 125 ML/HR: 0.9 INJECTION, SOLUTION INTRAVENOUS at 05:34

## 2018-07-10 RX ADMIN — ONDANSETRON 4 MG: 2 INJECTION INTRAMUSCULAR; INTRAVENOUS at 07:56

## 2018-07-10 RX ADMIN — FENTANYL CITRATE 50 MCG: 50 INJECTION INTRAMUSCULAR; INTRAVENOUS at 07:50

## 2018-07-10 NOTE — CASE MANAGEMENT
Initial Clinical Review    Admission: Date/Time/Statement: 07/09/2018 @  21:27   Observation    Orders Placed This Encounter   Procedures    Place in Observation (expected length of stay for this patient is less than two midnights)     Standing Status:   Standing     Number of Occurrences:   1     Order Specific Question:   Admitting Physician     Answer:   Selma Rodriguez     Order Specific Question:   Level of Care     Answer:   Med Surg [16]         ED: Date/Time/Mode of Arrival:   ED Arrival Information     Expected Arrival Acuity Means of Arrival Escorted By Service Admission Type    - 7/9/2018 18:20 Urgent Walk-In Spouse General Medicine Urgent    Arrival Complaint    brown urine          Chief Complaint:   Chief Complaint   Patient presents with    Blood in Urine     Pt presents to the ED for evaluation of "brown urine" since today, pt gave sample to UC  Pt reports small blood in urine 1 month ago but did not follow up with urology  pt takes coumadin for Afib  Pt denies urinary symptoms or pain       History of Illness:  80 y o  male with PMHx of PAF, DM, who presents with dark urine  Patient states he awoke this morning and went to urinate and found his urine to be dark  Patient states he urinated several times throughout the day as is normal for him however his pee appeared to get darker as the day went on  Finally around 4 PM he decided to pee into a cup and notes that the color looked like [employer]  Patient does admit to some LLQ/left groin pain and says this has been going on "constantine a while" perhaps months        ED Vital Signs:   ED Triage Vitals [07/09/18 1830]   Temperature Pulse Respirations Blood Pressure SpO2   98 2 °F (36 8 °C) 98 20 130/81 96 %      Temp Source Heart Rate Source Patient Position - Orthostatic VS BP Location FiO2 (%)   Oral Monitor Sitting Right arm --      Pain Score       No Pain        Wt Readings from Last 1 Encounters:   07/10/18 108 kg (238 lb)     Abnormal Labs/Diagnostic Test Results: PT/INR 20 6/1 83, Glucose 141    UA w Reflex to Microscopic w Reflex to Culture [[id number]] (Abnormal)   Lab Status: Final result  07/09/2018 Specimen: Urine from Urine, Clean Catch    Color, UA   Red    Clarity, UA   Cloudy    Specific Gravity, UA >=1 030 1 003 - 1 030    pH, UA 5 0 4 5 - 8 0    Leukocytes, UA Negative Negative    Nitrite, UA Positive (A) Negative    Protein,  (2+) (A) Negative mg/dl    Glucose, UA Negative Negative mg/dl    Ketones, UA Negative Negative mg/dl    Urobilinogen, UA 0 2 0 2, 1 0 E U /dl E U /dl    Bilirubin, UA Small (A) Negative    Blood, UA Large (A) Negative   Urine Microscopic [[id number]] (Abnormal)   Lab Status: Final result Specimen: Urine from Urine, Clean Catch    RBC, UA Field obscured, unable to enumerate     WBC, UA 1-2 (A) None Seen, 0-5, 5-55, 5-65 /hpf    Epithelial Cells Occasional None Seen, Occasional /hpf    Bacteria, UA Occasional None Seen,      CT of Abd/Pelvic: Ascending thoracic aortic aneurysm (2 5 cm)  Fatty infiltration of liver  Cholelithiasis without CT evidence of cholecystitis  Diffuse fatty atrophy of pancreas  Bilateral renal cortical cystic lesions, many of which are too small to accurately characterize  Left side renal collecting system stones and a 9 x 6 x 3 mm stone in the left UPJ causing at least partial obstruction with a delayed nephrogram and hydronephrosis   Limited evaluation of GI tract without oral contrast   No obstruction or perforation   Colonic diverticulosis without evidence of diverticulitis  Ectasia of the abdominal aorta at the level of the MICHAEL (2 8 cm)  Multilevel fusion of lumbar spine    There is likely a large synovial cyst in the sacral spinal canal       ED Treatment:   Medication Administration from 07/09/2018 1820 to 07/09/2018 2237       Date/Time Order Dose Route Action Action by Comments     07/09/2018 2128 sodium chloride 0 9 % bolus 1,000 mL 0 mL Intravenous Stopped Milton Montgomery RN      07/09/2018 1922 sodium chloride 0 9 % bolus 1,000 mL 1,000 mL Intravenous New Bag Judy Matos RN      34/77/9537 2001 iohexol (OMNIPAQUE) 350 MG/ML injection (MULTI-DOSE) 100 mL 100 mL Intravenous Given Frantz Coelloden      07/09/2018 2149 sodium chloride 0 9 % infusion 125 mL/hr Intravenous New Bag Judy Matos RN      91/19/8567 2149 levofloxacin (LEVAQUIN) IVPB (premix) 750 mg 750 mg Intravenous New Bag Isreal Landaverde RN           Past Medical/Surgical History: Active Ambulatory Problems     Diagnosis Date Noted    No Active Ambulatory Problems     Resolved Ambulatory Problems     Diagnosis Date Noted    No Resolved Ambulatory Problems     Past Medical History:   Diagnosis Date    A-fib [facility])     Aortic aneurysm (HCC)        Admitting Diagnosis: UTI (urinary tract infection) [N39 0]  Urine abnormality [R82 90]  Obstruction of left ureteropelvic junction (UPJ) due to stone [N20 1]    Age/Sex: 80 y o  male    Assessment/Plan:     Nephrolithiasis   Assessment & Plan     · CTAP with "9 x 6 x 3 mm stone in the left UPJ causing at least partial obstruction"  ? "delayed nephrogram and hydronephrosis"  ? UA positive for nitrites, protein, blood w occasional bacteria on microscopy  ? CBC wnl, remains afebrile, VSS  ? Renal function stable  · ED attending d/w Urology on call  ? IV Levaquin initiated, will continue   ? NPO after midnight  · Appreciate further Urology input   · IVF and pain control, strain urine           Type 2 diabetes mellitus (HCC)   Assessment & Plan     · Hold orals  · Q6 SSI while NPO  · Continue ARB  · Obtain A1c             Paroxysmal A-fib (HCC)   Assessment & Plan     · Continue BB, Coumadin on hold   ? Resume following procedure/resolution of hematuria  ? Currently NSR          GERD (gastroesophageal reflux disease)   Assessment & Plan     · Continue PPI          Deep vein thrombosis (DVT) during current hospitalization ([facility] [address] )   Assessment & Plan     · Ac on Coumadin  ?  INR subtherapeutic   · Will hold for now, resume following procedure/resolution of hematuria          Thoracic aortic aneurysm (HCC)   Assessment & Plan     · Stable, 4 5 cm on CTAP today  ?  per chart review was 4 5 cm on prior US          MARIAN (obstructive sleep apnea)   Assessment & Plan     · CPAP hs             VTE Prophylaxis: hold  / sequential compression device   Code Status: FULL  POLST: POLST form is not discussed and not completed at this time  Discussion with family: none     Anticipated Length of Stay:  Patient will be admitted on an Observation basis with an anticipated length of stay of  < 2 midnights     Justification for Hospital Stay: per plan above    Op Note    Procedures: 07/10/2018    CYSTOSCOPY RETROGRADE PYELOGRAM WITH INSERTION STENT URETERAL (Left)       Admission Orders:  Observation/MedSurg  Consult Uro r/e obstruction of left ureteropelvic junction due to stone   Bilateral Sequential Compression Device  Strain All Urine  Post Op Care per Protocol  C Pap @ HS  SSI  NSS @ 125    Scheduled Meds:   Current Facility-Administered Medications:  insulin lispro 1-6 Units Subcutaneous Q6H [address]   levofloxacin 750 mg Intravenous Q24H   metoprolol succinate 25 mg Oral Daily   valsartan 40 mg Oral Daily     IV Zofran 4 mg x 2 thus far  Miralax 17 gm x 2 thus far

## 2018-07-10 NOTE — TELEPHONE ENCOUNTER
Andre Goff is status post left retrograde pyelography and ureteral stent placement for complicated urinary tract infection  He will require ureteroscopy with laser lithotripsy in 2-3 weeks and I will place orders to schedule this procedure with the next available urologist given his social and living situation

## 2018-07-10 NOTE — DISCHARGE INSTRUCTIONS
After Visit Summary   10/4/2023    Clary Doe   MRN: [id number]           Visit Information     Date & Time  10/4/2023 11:15 AM Provider  Holly Lares DPM Department  [address], [address] [place] [address] Dept. Phone  [phone]      Allergies as of 10/4/2023  Review status set to Review Complete on 10/4/2023   No Known Allergies     Your Current Medications        Dosage    MULTIPLE VITAMIN OR Take by mouth As Directed. Diagnoses for This Visit    Nail discoloration   [246973]  -  Primary  Onychodystrophy   [605404]             We Ordered the Following     Normal Orders This Visit    Specimen to Pathology, Tissue [E] [[id number] CUSTOM]     Future Labs/Procedures Expected by Expires    Specimen to Pathology, Tissue [E] [[id number]]  10/4/2023 10/4/2024                Did you know that [facility] primary care physicians now offer Video Visits through [address] for adult patients for a variety of conditions such as allergies, back pain and cold symptoms? Skip the drive and waiting room and online chat with a doctor face-to-face using your web-cam enabled computer or mobile device wherever you are. Video Visits cost $50 and can be paid hassle-free using a credit, debit, or health savings card. Not active on [employer]? Ask us how to get signed up today! If you receive a survey from [employer], please take a few minutes to complete it and provide feedback. We strive to deliver the best patient experience and are looking for ways to make improvements. Your feedback will help us do so. For more information on [employer], please visit [email or web address]           No text in SmartText           No text in SmartText Ureteral Stent Placement   WHAT YOU NEED TO KNOW:     Ofelia Gu,    Today you had a cystoscopy and a left ureteral stent placement  You had pus and debris behind your stone and you absolutely needed this procedure  It will be normal to have urinary frequency, blood in your urine, and pain would urinate in your side  My office will contact you about setting up surgery in the time frame of approximately the next 2-3 weeks, hopefully  If you have questions or concerns, please do not hesitate to contact my office  Take care and be well,  Dr Alphonse Soriano  Ureteral stent placement is a procedure to open a blocked or narrow ureter  The ureter is the tube that carries urine from your kidney into your bladder  A stent is a thin hollow plastic tube used to hold your ureter open and allow urine to flow  The stent may stay in for several weeks  DISCHARGE INSTRUCTIONS:   Medicines:   · Pain medicine  may be given to take away or decrease pain  Do not wait until the pain is severe before you take your medicine  · Antibiotics  help prevent infections  Your healthcare provider may prescribe these for you while your stent remains in  · Take your medicine as directed  Contact your healthcare provider if you think your medicine is not helping or if you have side effects  Tell him or her if you are allergic to any medicine  Keep a list of the medicines, vitamins, and herbs you take  Include the amounts, and when and why you take them  Bring the list or the pill bottles to follow-up visits  Carry your medicine list with you in case of an emergency  Follow up with your urologist as directed: You will need regular follow-up visits with your urologist as long as the stent remains in  He will check to make sure the stent is working properly  He may do urine cultures to check for infection  Write down your questions so you remember to ask them during your visits  Self-care:   · Drink liquids  as directed   Ask your healthcare provider how much liquid to drink each day and which liquids are best for you  Fluids such as cranberry or apple juice may be especially helpful to prevent urinary infections  · Return to normal activities  the day after your stent placement or as directed by your healthcare provider  · You may take a shower  the day after your stent placement if your healthcare provider says it is okay  Contact your healthcare provider or urologist if:   · You have a fever or chills  · You feel like you need to urinate often  · You have pain when you urinate or pain around your bladder or kidney  · You see blood in your urine or it looks cloudy  · You have questions or concerns about your condition or care  Seek care immediately or call 911 if:   · You urinate little or not at all  · You have severe pain in your abdomen  © 2017 [employer]  Information is for End User's use only and may not be sold, redistributed or otherwise used for commercial purposes  All illustrations and images included in CareNotes® are the copyrighted property of [employer] M [employer] Inc  or Joey Ledezma  The above information is an  only  It is not intended as medical advice for individual conditions or treatments  Talk to your doctor, nurse or pharmacist before following any medical regimen to see if it is safe and effective for you

## 2018-07-10 NOTE — ASSESSMENT & PLAN NOTE
· Continue BB, Coumadin on hold   · Resume following procedure/resolution of hematuria  · Currently NSR

## 2018-07-10 NOTE — ANESTHESIA POSTPROCEDURE EVALUATION
Post-Op Assessment Note      CV Status:  Stable    Mental Status:  Awake (sleepy)    Hydration Status:  Euvolemic    PONV Controlled:  Controlled    Airway Patency:  Patent    Post Op Vitals Reviewed: Yes          Staff: Anesthesiologist           BP   122/73   Temp  98 4   Pulse  74   Resp   12   SpO2   97

## 2018-07-10 NOTE — H&P
The patient was seen and examined  There are no changes from the prior inpatient history and physical examination as listed in our consultation note  The patient is appropriately prepared for surgery today as planned

## 2018-07-10 NOTE — TELEPHONE ENCOUNTER
Spoke to the pt about scheduling his surgery with the 1st available Urologist   I have him set up for 7/24 at [address] [facility] with Dr Shlomo Michael     I asked the pt for his current address as he is a resident of [place] and he stated he is staying at his father's house; but was unsure of his address  He stated that he will have his father call back with the address so I can mail him his instructions  I explained to him the PAT process and that he needs to have them done at a [facility] this week for his surgery on 7/24  He stated he understood this

## 2018-07-10 NOTE — ASSESSMENT & PLAN NOTE
· Ac on Coumadin  · INR subtherapeutic   · Will hold for now, resume following procedure/resolution of hematuria

## 2018-07-10 NOTE — ASSESSMENT & PLAN NOTE
· CTAP with "9 x 6 x 3 mm stone in the left UPJ causing at least partial obstruction"  · "delayed nephrogram and hydronephrosis"  · UA positive for nitrites, protein, blood w occasional bacteria on microscopy  · CBC wnl, remains afebrile, VSS  · Renal function stable  · ED attending d/w Urology on call  ·  IV Levaquin initiated, will continue   · NPO after midnight  · Appreciate further Urology input   · IVF and pain control, strain urine

## 2018-07-10 NOTE — ANESTHESIA PREPROCEDURE EVALUATION
Review of Systems/Medical History  Patient summary reviewed    No history of anesthetic complications     Cardiovascular  EKG reviewed, Exercise tolerance (METS): >4,  Dysrhythmias , atrial fibrillation,    Pulmonary  Smoker ex-smoker  ,        GI/Hepatic    GERD well controlled,        Kidney stones,        Endo/Other  Diabetes well controlled type 2 Oral agent,   Obesity    GYN       Hematology  Negative hematology ROS      Musculoskeletal  Negative musculoskeletal ROS        Neurology  Negative neurology ROS      Psychology   Negative psychology ROS              Physical Exam    Airway    Mallampati score: II  TM Distance: >3 FB  Neck ROM: full     Dental   Comment: Multiple missing, no significant loose ,     Cardiovascular  Rhythm: regular, Rate: normal,     Pulmonary  Breath sounds clear to auscultation,     Other Findings        Anesthesia Plan  ASA Score- 3 Emergent    Anesthesia Type- general with ASA Monitors  Additional Monitors:   Airway Plan: LMA  Plan Factors-    Induction- intravenous  Postoperative Plan-   Planned trial extubation    Informed Consent- Anesthetic plan and risks discussed with patient  I personally reviewed this patient with the CRNA  Discussed and agreed on the Anesthesia Plan with the CRNA  Lenka Lance

## 2018-07-10 NOTE — DISCHARGE SUMMARY
Discharge- Matteoestrella Caraballo 1950, 80 y o  male MRN: [id number]    Unit/Bed#: -01 Encounter: [id number]    Primary Care Provider: No primary care provider on file  Date and time admitted to hospital: 7/9/2018  6:25 PM        Obstruction of left ureteropelvic junction (UPJ) due to stone   Assessment & Plan    Status post cystoscopy, retrograde pyelogram and left ureteric stent placement  Outpatient Urology follow-up        Paroxysmal [facility])   Assessment & Plan    · Continue BB, Coumadin resumed postoperatively   · Currently NSR        MARIAN (obstructive sleep apnea)   Assessment & Plan    · CPAP hs        Thoracic aortic aneurysm (HCC)   Assessment & Plan    · Stable, 4 5 cm on CTAP  Recommend outpatient follow up with serial imaging  DVT (deep venous thrombosis) ([facility] )   Assessment & Plan    · Patient was recently diagnosed with left lower extremity DVT   Ac on Coumadin  · INR subtherapeutic Resume  Coumadin at home dose with periodic INR monitoring  GERD (gastroesophageal reflux disease)   Assessment & Plan    · Continue PPI        Type 2 diabetes mellitus ([facility] )   Assessment & Plan    Resume outpatient insulin regimen  * Nephrolithiasis   Assessment & Plan    · CTAP with "9 x 6 x 3 mm stone in the left UPJ causing at least partial obstruction"  · "delayed nephrogram and hydronephrosis"  · UA positive for nitrites, protein, blood w occasional bacteria on microscopy  · CBC wnl, remains afebrile, VSS  · Renal function stable  · The patient went to OR underwent cystoscopy, retrograde pyelogram, and insertion of left ureteric stent  · Postoperatively denied any discomfort or pain  Slight hematuria  · Stable for discharge home per Urology on as needed analgesics, oxybutynin, nitrofurantoin for 5 days  · Outpatient Urology follow-up on 07/24  Discharging Physician / Practitioner: Renata Ford MD  PCP: No primary care provider on file    Admission Date:   Admission Orders     Ordered        07/09/18 2128  Place in Observation (expected length of stay for this patient is less than two midnights)  Once             Discharge Date: 07/15/18    Resolved Problems  Date Reviewed: 7/15/2018    None          Consultations During Hospital Stay:  · Urology    Procedures Performed:     · Cystoscopy, retrograde pyelogram left ureteric stent placement  Significant Findings / Test Results:     CT Scan Abdomen:Ascending thoracic aortic aneurysm (2 5 cm)     Cholelithiasis without CT evidence of cholecystitis        · Left side renal collecting system stones and a 9 x 6 x 3 mm stone in the left UPJ causing at least partial obstruction with a delayed nephrogram and hydronephrosis  Urologic consultation recommended    Incidental Findings:   · Above     Test Results Pending at Discharge (will require follow up):   · Urine culture        Reason for Admission:  Flank discomfort  Hospital Course:     Yamilet Flores is a 80 y o  male patient who originally presented to the hospital on 7/9/2018 due to blood in the urine  He also had some left lower quadrant groin pain  He was found on imaging to have left ureteropelvic junction stone with partial obstruction and hydronephrosis  Urology was consulted  Patient was taken to the operating room and underwent cystoscopy retrograde pyelogram and left ureteric stent placement  He was afebrile and had no leukocytosis on admission  He was prescribed empiric antibiotics for 5 days pending urine culture results  Outpatient follow-up with Urology was arranged on 07/24  He was deemed stable for discharge once he was able to pass urine  Patient was advised to contact Urology should he have nikolas hematuria, worsening flank discomfort or inability to pass urine  Please see above list of diagnoses and related plan for additional information       Condition at Discharge: stable     Discharge Day Visit / Exam:     Subjective:  Patient denies any flank discomfort   Minimal dysuria postoperatively  Vitals: Blood Pressure: 151/79 (07/10/18 0855)  Pulse: 71 (07/10/18 0855)  Temperature: 97 9 °F (36 6 °C) (07/10/18 0855)  Temp Source: Oral (07/10/18 0855)  Respirations: 19 (07/10/18 0855)  Height: 5' 9" (175 3 cm) (07/10/18 0724)  Weight - Scale: 108 kg (238 lb) (07/10/18 0724)  SpO2: 95 % (07/10/18 0855)  Exam:   Physical Exam   Constitutional: He is oriented to person, place, and time  No distress  HENT:   Head: Normocephalic and atraumatic  Mouth/Throat: Oropharynx is clear and moist    Eyes: Pupils are equal, round, and reactive to light  Neck: Neck supple  No JVD present  Cardiovascular: Normal rate and regular rhythm  Pulmonary/Chest: Effort normal and breath sounds normal    Abdominal: Soft  Bowel sounds are normal    Musculoskeletal: He exhibits no edema  Neurological: He is alert and oriented to person, place, and time  Skin: Skin is warm  He is not diaphoretic  Discussion with Family:  Discussed with patient at length at bedside    Discharge instructions/Information to patient and family:   See after visit summary for information provided to patient and family  Provisions for Follow-Up Care:  See after visit summary for information related to follow-up care and any pertinent home health orders  Disposition:     Home    For Discharges to [facility] SNF:   · Not Applicable to this Patient - Not Applicable to this Patient    Planned Readmission: No     Discharge Statement:  I [id number] discharging the patient  This time was spent on the day of discharge  I had direct contact with the patient on the day of discharge  Greater than 50% of the total time was spent examining patient, answering all patient questions, arranging and discussing plan of care with patient as well as directly providing post-discharge instructions  Additional time then spent on discharge activities      Discharge Medications:  See after visit summary for reconciled discharge medications provided to patient and family        ** Please Note: This note has been constructed using a voice recognition system **

## 2018-07-10 NOTE — ASSESSMENT & PLAN NOTE
Status post cystoscopy, retrograde pyelogram and left ureteric stent placement    Outpatient Urology follow-up

## 2018-07-10 NOTE — CONSULTS
UROLOGY CONSULTATION NOTE     Patient Identifiers: Jacque Liao (MRN [id number])  Service Requesting Consultation: [facility]  Service Providing Consultation:  Urology, Jorge A Rod PA-C    Date of Service: 7/10/2018    Reason for Consultation: UPJ stone    History of Present Illness:     Jacque Liao is a 80 y o  old with a history of urethral stenosis status post dilation at 16years of age presenting to the emergency department due to brown colored urine with some discomfort  Patient states that he noticed brown color urine the other day  He is having intermittent death comfort  Denies any overt flank pain, fevers, chills, dysuria  Did admit to increased urinary urgency and frequency  CT in the emergency department revealed a 9 mm left UPJ calculus  Patient urine also was nitrite positive  Patient is nontoxic and comfortable on examination today  Patient admits to history urethral stenosis which was dilated at age of 16years old  He denies any further urologic history other than a kidney stone approximately 5 years ago which she was able to pass on his own  Patient denies any further genitourinary manipulation  Patient is on warfarin due to a history of atrial fibrillation  Patient does reside in [place]  He is currently visiting [place] due to his father who is in poor health  Patient is unsure how long he will be in [place] depending on his father's health  Past Medical, Past Surgical History:     Past Medical History:   Diagnosis Date    A-fib [facility])     Aortic aneurysm ([facility] [address] )    :    History reviewed   No pertinent surgical history :    Medications, Allergies:     Current Facility-Administered Medications:     acetaminophen (TYLENOL) tablet 650 mg, 650 mg, Oral, Q6H PRN, [employer], AZAR    insulin lispro (HumaLOG) 100 units/mL subcutaneous injection 1-6 Units, 1-6 Units, Subcutaneous, Q6H LALA **AND** Fingerstick Glucose (POCT), , , Q6H, [employer], AZAR    levofloxacin (LEVAQUIN) IVPB (premix) 750 mg, 750 mg, Intravenous, Q24H, Andree Carrillo PA-C    metoprolol succinate (TOPROL-XL) 24 hr tablet 25 mg, 25 mg, Oral, Daily, Chay Faustin PA-C    ondansetron (ZOFRAN) injection 4 mg, 4 mg, Intravenous, Q6H PRN, Chay Faustin PA-C    polyethylene glycol (MIRALAX) packet 17 g, 17 g, Oral, Daily PRN, Chay Faustin PA-C    sodium chloride 0 9 % infusion, 125 mL/hr, Intravenous, Continuous, Corry Moses, , Last Rate: 125 mL/hr at 07/10/18 0534, 125 mL/hr at 07/10/18 0534    valsartan (DIOVAN) tablet 40 mg, 40 mg, Oral, Daily, Chay Faustin PA-C    Allergies: Allergies   Allergen Reactions    Penicillins    :    Social and Family History:   Social History:   Social History   Substance Use Topics    Smoking status: Never Smoker    Smokeless tobacco: Never Used    Alcohol use Yes      Comment: occ     History   Smoking Status    Never Smoker   Smokeless Tobacco    Never Used       Family History:  History reviewed  No pertinent family history :     Review of Systems:     General: Fever, chills, or night sweats: negative  Cardiac: Negative for chest pain  Pulmonary: Negative for shortness of breath  Gastrointestinal: Abdominal pain positive  Nausea, vomiting, or diarrhea negative,  Genitourinary: See HPI above  Patient does not have hematuria  All other systems queried were negative  Physical Exam:   General: Patient is pleasant and in NAD  Awake and alert  /63 (BP Location: Left arm)   Pulse 82   Temp 98 4 °F (36 9 °C) (Oral)   Resp 18   Ht 5' 9 76" (1 772 m)   SpO2 93%   BMI 34 47 kg/m²   Cardiac: Peripheral edema: negative  Pulmonary: Non-labored breathing  Abdomen: Soft, non-tender, non-distended  No surgical scars  No masses, tenderness, hernias noted  Genitourinary: Negative CVA tenderness, negative suprapubic tenderness  BAE: none   Urine chapis colored in urinal      Labs:     Lab Results Component Value Date    HGB 15 2 07/10/2018    HCT 44 3 07/10/2018    WBC 6 71 07/10/2018     07/10/2018   ]    Lab Results   Component Value Date     07/10/2018    K 4 4 07/10/2018     07/10/2018    CO2 21 07/10/2018    BUN 22 07/10/2018    CREATININE 1 08 07/10/2018    CALCIUM 7 9 (L) 07/10/2018    GLUCOSE 154 (H) 07/10/2018   ]    Imaging:   I personally reviewed the images and report of the following studies, and reviewed them with the patient:  CT ABDOMEN AND PELVIS WITH IV CONTRAST     INDICATION:   painless hematuria      COMPARISON: None      TECHNIQUE:  CT examination of the abdomen and pelvis was performed  Axial, sagittal, and coronal 2D reformatted images were created from the source data and submitted for interpretation      Radiation dose length product (DLP) for this visit:  1054 mGy-cm   This examination, like all CT scans performed in the [facility], was performed utilizing techniques to minimize radiation dose exposure, including the use of iterative   reconstruction and automated exposure control      IV Contrast:  100 mL of iohexol (OMNIPAQUE)  350  Enteric Contrast:  Enteric contrast was not administered      FINDINGS:     ABDOMEN     LOWER CHEST:  Atelectatic changes both lung bases  Cardiac size within normal limits  Coronary atherosclerosis  Valvular calcifications  No significant pericardial effusion  Ascending thoracic aorta measures 4 5 cm which is aneurysmal by size   criteria      LIVER/BILIARY TREE:  Fatty infiltration of liver  No intrahepatic biliary ductal dilatation  No obvious hepatic mass     GALLBLADDER:  Calcified gallstone in the gallbladder neck  No pericholecystic inflammatory changes or fluid      SPLEEN:  Unremarkable      PANCREAS:  Diffuse fatty atrophy      ADRENAL GLANDS:  Unremarkable      KIDNEYS/URETERS:    Right kidney: Numerous small cystic lesions    No hydronephrosis or renal colic      Left kidney: Exophytic cyst upper pole measuring 1 7 cm  Additional much smaller cortical low-attenuation lesions which are likely cysts but are too small to fully characterize  Nonobstructing lower pole collecting system stone measuring 10 mm  Ovoid   calculus in the UPJ measuring 9 mm in length by 6 mm x 3 mm diameter causing mild hydronephrosis and a slightly delayed left-sided nephrogram compared to the right side  The ureter below this level is decompressed      STOMACH AND BOWEL:  Stomach is partially collapsed and otherwise was unremarkable  The small bowel appears average caliber without evidence of obstruction  The right colon contains a small amount of residual stool  Transverse colon is mostly empty  Descending colon is mostly collapsed  Diffuse colonic diverticulosis without evidence of diverticulitis      APPENDIX:  No findings to suggest appendicitis      ABDOMINOPELVIC CAVITY: No ascites  No free air  No adenopathy      VESSELS:  Calcified atherosclerotic plaque  Infrarenal abdominal aortic ectasia at the level of the MICHAEL measuring 2 8 cm      PELVIS     REPRODUCTIVE ORGANS:  Unremarkable for patient's age      URINARY BLADDER:  Unremarkable      ABDOMINAL WALL/INGUINAL REGIONS:  Tiny fat-containing left inguinal hernia  Tiny fat-containing umbilical hernia      OSSEOUS STRUCTURES:  Multilevel fusion of the lumbar spine from L1 through L5  Exaggerated degenerative changes at T12-L1 and L5-S1    There is a fluid attenuation structure expanding the sacral spinal canal, likely a large synovial cyst           IMPRESSION:  Ascending thoracic aortic aneurysm (2 5 cm)     Fatty infiltration of liver      Cholelithiasis without CT evidence of cholecystitis      Diffuse fatty atrophy of pancreas      Bilateral renal cortical cystic lesions, many of which are too small to accurately characterize      Left side renal collecting system stones and a 9 x 6 x 3 mm stone in the left UPJ causing at least partial obstruction with a delayed nephrogram and hydronephrosis  Urologic consultation recommended      Limited evaluation of GI tract without oral contrast   No obstruction or perforation  Colonic diverticulosis without evidence of diverticulitis      Ectasia of the abdominal aorta at the level of the MICHAEL (2 8 cm)      Multilevel fusion of lumbar spine      There is likely a large synovial cyst in the sacral spinal canal   Nonemergent MRI correlation recommended, especially if there are neurologic symptoms  ASSESSMENT:     9mm left UPJ calculus    Urinary infection    PLAN:     I reviewed with the patient's his 9 mm obstructing stone  We discussed given the setting of urinary infection, recommendations are for cystoscopy, retrograde pyelogram, and ureteral stent for renal decompression and treatment with antibiosis  This require a secondary procedure of cystoscopy, ureteroscopy, holmium laser, basket extraction, retrograde pyelogram, ureteral stent once patient has been treated with a course of antibiosis  Patient understands that he will need a secondary surgery and that we strongly recommend him to remain in [place] until this is addressed  I reviewed with the patient the risks of the procedure including but not limited to cardiopulmonary complications, VTE, bleeding, infection, damage to nearby structures, need for additional procedures, stent pain, and ureteral stricture  Patient verbalized understanding  These will be reviewed by the surgeon as well  Patient is tentatively scheduled for cystoscopy, retrograde pyelogram, and left ureteral stent insertion this morning  He has been NPO since midnight  I will have my office contact him for outpatient follow-up to arrange for his secondary surgery  Thank you for allowing me to participate in this patients care  Please do not hesitate to call with any additional questions    Wanda Shields PA-C

## 2018-07-10 NOTE — OP NOTE
OPERATIVE REPORT  PATIENT NAME: Matteo Caraballo    :  1950  MRN: [id number]  Pt Location: AN OR ROOM 03    SURGERY DATE: 7/10/2018    Surgeon(s) and Role:     * Aaron Mckeon MD - Primary    Preop Diagnosis:  Obstruction of left ureteropelvic junction (UPJ) due to stone [N20 1]    Post-Op Diagnosis Codes:     * Obstruction of left ureteropelvic junction (UPJ) due to stone [N20 1]    Procedure(s) (LRB):  CYSTOSCOPY RETROGRADE PYELOGRAM WITH INSERTION STENT URETERAL (Left)    Specimen(s):  ID Type Source Tests Collected by Time Destination   A : CYSTO URINE FOR CULTURE Urine Urine, Cystoscopic URINE CULTURE Aaron Mckeon MD 7/10/2018 1380        Estimated Blood Loss:   Minimal    Drains:       Anesthesia Type:   General    Operative Indications:  Obstruction of left ureteropelvic junction (UPJ) due to stone [N20 1]      Operative Findings:  Normal urethra, small membrane like stricture at the bulbar urethra which was easily passed with the scope, slight hypertrophy of the prostatic lobes, no bladder masses, orthotopic ureteral orifices, debris and pus was seen to exit from behind the stone after stent placement this was sent for culture  Successful placement of 6 German by 26 centimeter left ureteral stent    Complications:   None    Procedure and Technique:    RADIOLOGIC FINDINGS:    1  Retrograde pyelogram was performed on the left side using a 5 Fr open ended catheter  10 of 50% dilute contrast was injected  2  The following findings were noted: Mild hydronephrosis, a filling defect at the ureteropelvic junction consistent with stone was noted        INDICATIONS FOR PROCEDURE:  Herb Brittle is an 80 y o  old male with a left hydronephrosis, and obstructing stone, and positive urinalysis for infection  After discussing the options, the patient elected to undergo ureteral stent placement    We discussed the procedure in detail, the alternatives, and the risks, and they signed informed consent to proceed  PROCEDURE IN DETAIL:   The patient was identified and brought to the OR  Antibiotic prophylaxis and DVT prophylaxis were administered  They were placed in the comfortable dorsal lithotomy position with care to pad all pressure points  They were prepped and draped in the usual sterile fashion using hibiclens  A surgical time out was performed with all in the room in agreement with the correct patient, procedure, indications, and laterality  A 21-Cuban rigid cystoscope was used to enter the bladder  The bladder was inspected in its entirety and there were no lesions noted  The ureteral orifices were identified in their orthotopic positions  The left ureteral orifice was identified and a 5 Fr open ended catheter was placed into the ureteral orifice  The stone was not visible on  fluoroscopic guidance  A retrograde pyelogram was performed with injection of 50/50 Isovue which demonstrated mild to moderate hydroureteronephrosis  A solo wire was up to the kidney under fluoroscopic guidance  A 6 Cuban by 26 centimeter left JJ stent was then passed up the wire  under fluoroscopic guidance into the left  kidney with a good curl noted in the kidney and in the bladder  The bladder was drained  The string was removed      The patient was placed back in the supine position, awakened from general anesthesia and brought to the recovery room in stable condition  SPECIMENS:     Order Name Source Comment Collection Info Order Time   URINE CULTURE Urine, Cystoscopic  Collected By: Dickson Queen MD 7/10/2018  7:58 AM        IMPLANTS:     Implant Name Type Inv   Item Serial No   Lot No  LRB No  Used   URETERAL STENT 6 FR X 26 CM OPTIMA INLAY - [id number]   URETERAL STENT 6 FR X 26 CM OPTIMA INLAY   [employer] [id number] Left 1        COMPLICATIONS:  None    DISPOSITION: PACU     PLAN:  The patient will be discharged back to the medical service, he may be discharged home when he is clinically stable, I have written him for pain medication, stool softener, medications to make his stent more comfortable, and 5 days of nitrofurantoin for empiric treatment     I was present for the entire procedure and A qualified resident physician was not available    Patient Disposition:  PACU     SIGNATURE: Sharon Vance MD  DATE: July 10, 2018  TIME: 8:00 AM

## 2018-07-10 NOTE — H&P
H&P- Nabila Arce 1950, 80 y o  male MRN: [id number]    Unit/Bed#: MATIAS Encounter: [id number]    Primary Care Provider: No primary care provider on file  Date and time admitted to hospital: 7/9/2018  6:25 PM    Nephrolithiasis   Assessment & Plan    · CTAP with "9 x 6 x 3 mm stone in the left UPJ causing at least partial obstruction"  · "delayed nephrogram and hydronephrosis"  · UA positive for nitrites, protein, blood w occasional bacteria on microscopy  · CBC wnl, remains afebrile, VSS  · Renal function stable  · ED attending d/w Urology on call  ·  IV Levaquin initiated, will continue   · NPO after midnight  · Appreciate further Urology input   · IVF and pain control, strain urine         Type 2 diabetes mellitus (HCC)   Assessment & Plan    · Hold orals  · Q6 SSI while NPO  · Continue ARB  · Obtain A1c          Paroxysmal A-fib (HCC)   Assessment & Plan    · Continue BB, Coumadin on hold   · Resume following procedure/resolution of hematuria  · Currently NSR        GERD (gastroesophageal reflux disease)   Assessment & Plan    · Continue PPI        Deep vein thrombosis (DVT) during current hospitalization ([facility] [address] )   Assessment & Plan    · Ac on Coumadin  · INR subtherapeutic   · Will hold for now, resume following procedure/resolution of hematuria        Thoracic aortic aneurysm ([facility] [address] )   Assessment & Plan    · Stable, 4 5 cm on CTAP today  ·  per chart review was 4 5 cm on prior US        MARIAN (obstructive sleep apnea)   Assessment & Plan    · CPAP hs          VTE Prophylaxis: hold  / sequential compression device   Code Status: FULL  POLST: POLST form is not discussed and not completed at this time  Discussion with family: none    Anticipated Length of Stay:  Patient will be admitted on an Observation basis with an anticipated length of stay of  < 2 midnights  Justification for Hospital Stay: per plan above    Total Time for Visit, including Counseling / Coordination of Care: 30 minutes    Greater than 50% of this total time spent on direct patient counseling and coordination of care  Chief Complaint:   Dark urine    History of Present Illness:    Franc Alex is a 80 y o  male with PMHx of PAF, DM, who presents with dark urine  Patient states he awoke this morning and went to urinate and found his urine to be dark  Patient states he urinated several times throughout the day as is normal for him however his pee appeared to get darker as the day went on  Finally around 4 PM he decided to pee into a cup and notes that the color looked like [employer]  He showed it to his sister who urged him to come to the ED  Patient states he otherwise has had no dysuria although he has frequency and urgency at baseline  He did not take notice to any change infrequency today  He denies fever or chills  Patient does admit to some LLQ/left groin pain and says this has been going on "constantine a while" perhaps months  He denies back pain  He denies chest pain, SOB, HA, syncope, dizziness, n/v or diarrhea  Pt says about a month and a half ago his PCP found microscopic hematuria on his UA  He repeated this test 30 days later to find that there was only more microscopic blood  Pt states his PCP thought this could be due to his warfarin but referred him to a Urologist  Pt has not yet followed up with a Urologist       Pt requests that if possible, he may be discharged soon as his "father is dying" and he is worried he will miss his passing  I informed him I would pass this along  Review of Systems:    Review of Systems   Constitutional: Negative  HENT: Negative  Eyes: Negative  Respiratory: Negative  Cardiovascular: Negative  Gastrointestinal: Positive for abdominal pain (LLQ)  Endocrine: Negative  Genitourinary: Positive for frequency, hematuria and urgency  Musculoskeletal: Negative  Skin: Negative  Allergic/Immunologic: Negative  Neurological: Negative  Hematological: Negative  Psychiatric/Behavioral: Negative  Past Medical and Surgical History:     Past Medical History:   Diagnosis Date    A-fib [facility])     Aortic aneurysm ([address] )        History reviewed  No pertinent surgical history  Meds/Allergies:    Prior to Admission medications    Medication Sig Start Date End Date Taking? Authorizing Provider   glipiZIDE (GLUCOTROL) 5 mg tablet Take 5 mg by mouth daily   Yes Historical Provider, MD   losartan (COZAAR) 25 mg tablet Take 25 mg by mouth daily   Yes Historical Provider, MD   metoprolol succinate (TOPROL-XL) 25 mg 24 hr tablet Take 25 mg by mouth daily   Yes Historical Provider, MD   warfarin (COUMADIN) 10 mg tablet Take 10 mg by mouth 2 (two) times a week   Yes Historical Provider, MD   warfarin (COUMADIN) 5 mg tablet Take 5 mg by mouth 4 (four) times a week   Yes Historical Provider, MD     I have reviewed home medications with patient personally  Allergies: Allergies   Allergen Reactions    Penicillins        Social History:     Marital Status: /Civil Union   Occupation: not discussed  Patient Pre-hospital Living Situation: not discussed  Patient Pre-hospital Level of Mobility: independent  Patient Pre-hospital Diet Restrictions: none  Substance Use History:   History   Alcohol Use    Yes     Comment: occ     History   Smoking Status    Never Smoker   Smokeless Tobacco    Never Used     History   Drug Use No       Family History:    non-contributory    Physical Exam:     Vitals:   Blood Pressure: 122/73 (07/09/18 2022)  Pulse: 91 (07/09/18 2022)  Temperature: 98 2 °F (36 8 °C) (07/09/18 1830)  Temp Source: Oral (07/09/18 1830)  Respirations: 20 (07/09/18 2022)  SpO2: 98 % (07/09/18 2022)    Physical Exam   Constitutional: He appears well-developed and well-nourished  No distress  HENT:   Head: Normocephalic and atraumatic     Mouth/Throat: Oropharynx is clear and moist    Cardiovascular: Normal rate, regular rhythm, normal heart sounds and intact distal pulses  Exam reveals no gallop and no friction rub  No murmur heard  Pulmonary/Chest: Effort normal and breath sounds normal  No respiratory distress  He has no wheezes  He has no rales  He exhibits no tenderness  Abdominal: Soft  Bowel sounds are normal  He exhibits no distension and no mass  There is tenderness (LLQ)  There is no rebound and no guarding  Negative CVAT bilaterally   Musculoskeletal: He exhibits no edema or tenderness  Neurological: He is alert  Skin: Skin is warm and dry  No rash noted  He is not diaphoretic  No erythema  No pallor  Psychiatric: He has a normal mood and affect  His behavior is normal    Nursing note and vitals reviewed  Additional Data:     Lab Results: I have personally reviewed pertinent reports  Results from last 7 days  Lab Units 07/09/18 1918   WBC Thousand/uL 8 04   HEMOGLOBIN g/dL 16 4   HEMATOCRIT % 47 7   PLATELETS Thousands/uL 199   NEUTROS PCT % 60   LYMPHS PCT % 30   MONOS PCT % 9   EOS PCT % 1       Results from last 7 days  Lab Units 07/09/18 1918   SODIUM mmol/L 140   POTASSIUM mmol/L 4 2   CHLORIDE mmol/L 106   CO2 mmol/L 22   BUN mg/dL 23   CREATININE mg/dL 1 20   CALCIUM mg/dL 8 6   TOTAL PROTEIN g/dL 7 5   BILIRUBIN TOTAL mg/dL 1 30*   ALK PHOS U/L 107   ALT U/L 24   AST U/L 18   GLUCOSE RANDOM mg/dL 141*       Results from last 7 days  Lab Units 07/09/18 1918   INR  1 83*               Imaging: I have personally reviewed pertinent reports  CT abdomen pelvis with contrast   Final Result by Juana Trujillo DO (07/09 2041)   Ascending thoracic aortic aneurysm (2 5 cm)      Fatty infiltration of liver  Cholelithiasis without CT evidence of cholecystitis  Diffuse fatty atrophy of pancreas  Bilateral renal cortical cystic lesions, many of which are too small to accurately characterize        Left side renal collecting system stones and a 9 x 6 x 3 mm stone in the left UPJ causing at least partial obstruction with a delayed nephrogram and hydronephrosis  Urologic consultation recommended  Limited evaluation of GI tract without oral contrast   No obstruction or perforation  Colonic diverticulosis without evidence of diverticulitis  Ectasia of the abdominal aorta at the level of the MICHAEL (2 8 cm)  Multilevel fusion of lumbar spine  There is likely a large synovial cyst in the sacral spinal canal   Nonemergent MRI correlation recommended, especially if there are neurologic symptoms  Workstation performed: [id number]             Allscripts / Epic Records Reviewed: Yes     ** Please Note: This note has been constructed using a voice recognition system   **

## 2018-07-11 LAB — BACTERIA UR CULT: NORMAL

## 2018-07-12 ENCOUNTER — TELEPHONE (OUTPATIENT)
Dept: UROLOGY | Facility: CLINIC | Age: 68
End: 2018-07-12

## 2018-07-12 LAB
BACTERIA UR CULT: NORMAL
GLUCOSE SERPL-MCNC: 146 MG/DL (ref 65–140)

## 2018-07-12 NOTE — TELEPHONE ENCOUNTER
Patient had surgery with Dr Ramos on 7/10/18 and had cystoscopy retrograde pyelogram with insertion stent ureteral (left bladder)  Patient is scheduled for surgery with Dr Lombardo for 7/24/18  Patient was calling because he has been having intermittent hematuria since surgery  Called and spoke to patient  Patient denies any other urological symptoms  Explained that this is normal while he has the stent inserted  Explained to patient that this can happen intermittently until a few days after the stent is removed  Patient verbalized understanding and denies any other questions at this time

## 2018-07-13 ENCOUNTER — ANESTHESIA EVENT (OUTPATIENT)
Dept: PERIOP | Facility: HOSPITAL | Age: 68
End: 2018-07-13
Payer: MEDICARE

## 2018-07-15 NOTE — ASSESSMENT & PLAN NOTE
· CTAP with "9 x 6 x 3 mm stone in the left UPJ causing at least partial obstruction"  · "delayed nephrogram and hydronephrosis"  · UA positive for nitrites, protein, blood w occasional bacteria on microscopy  · CBC wnl, remains afebrile, VSS  · Renal function stable  · The patient went to OR underwent cystoscopy, retrograde pyelogram, and insertion of left ureteric stent  · Postoperatively denied any discomfort or pain  Slight hematuria  · Stable for discharge home per Urology on as needed analgesics, oxybutynin, nitrofurantoin for 5 days  · Outpatient Urology follow-up on 07/24

## 2018-07-15 NOTE — ASSESSMENT & PLAN NOTE
· Patient was recently diagnosed with left lower extremity DVT   Ac on Coumadin  · INR subtherapeutic Resume  Coumadin at home dose with periodic INR monitoring

## 2018-07-20 RX ORDER — OMEPRAZOLE 20 MG/1
20 CAPSULE, DELAYED RELEASE ORAL EVERY MORNING
COMMUNITY

## 2018-07-20 NOTE — PRE-PROCEDURE INSTRUCTIONS
Pre-Surgery Instructions:   Medication Instructions    glipiZIDE (GLUCOTROL) 5 mg tablet Instructed patient per Anesthesia Guidelines   losartan (COZAAR) 25 mg tablet Instructed patient per Anesthesia Guidelines   metoprolol succinate (TOPROL-XL) 25 mg 24 hr tablet Instructed patient per Anesthesia Guidelines   omeprazole (PriLOSEC) 20 mg delayed release capsule Instructed patient per Anesthesia Guidelines   oxybutynin (DITROPAN) 5 mg tablet Instructed patient per Anesthesia Guidelines   tamsulosin (FLOMAX) 0 4 mg Instructed patient per Anesthesia Guidelines   warfarin (COUMADIN) 10 mg tablet Instructed patient per Anesthesia Guidelines   warfarin (COUMADIN) 5 mg tablet Instructed patient per Anesthesia Guidelines  Per anesthesia patient was told to stop  NSAIDS and supplements one week preop and on DOS with sip of water will take Omeprazole  He will call Dr Nasir Groves office today to make sure Coumadin is not supposed to be stopped  Instructed on use of Chlorhexidine for preoperative bathing per hospital protocol

## 2018-07-24 ENCOUNTER — APPOINTMENT (OUTPATIENT)
Dept: RADIOLOGY | Facility: HOSPITAL | Age: 68
End: 2018-07-24
Payer: MEDICARE

## 2018-07-24 ENCOUNTER — HOSPITAL ENCOUNTER (OUTPATIENT)
Facility: HOSPITAL | Age: 68
Setting detail: OUTPATIENT SURGERY
Discharge: HOME | End: 2018-07-24
Attending: UROLOGY | Admitting: UROLOGY
Payer: MEDICARE

## 2018-07-24 ENCOUNTER — ANESTHESIA (OUTPATIENT)
Dept: PERIOP | Facility: HOSPITAL | Age: 68
End: 2018-07-24
Payer: MEDICARE

## 2018-07-24 VITALS
HEART RATE: 65 BPM | TEMPERATURE: 98.3 F | OXYGEN SATURATION: 96 % | BODY MASS INDEX: 33.64 KG/M2 | HEIGHT: 70 IN | WEIGHT: 235 LBS | SYSTOLIC BLOOD PRESSURE: 159 MMHG | DIASTOLIC BLOOD PRESSURE: 78 MMHG | RESPIRATION RATE: 18 BRPM

## 2018-07-24 DIAGNOSIS — N20.0 NEPHROLITHIASIS: ICD-10-CM

## 2018-07-24 LAB
GLUCOSE SERPL-MCNC: 150 MG/DL (ref 65–140)
GLUCOSE SERPL-MCNC: 162 MG/DL (ref 65–140)
INR PPP: 1.68 (ref 0.86–1.17)
PROTHROMBIN TIME: 19.9 SECONDS (ref 11.8–14.2)

## 2018-07-24 PROCEDURE — C2617 STENT, NON-COR, TEM W/O DEL: HCPCS | Performed by: UROLOGY

## 2018-07-24 PROCEDURE — C1758 CATHETER, URETERAL: HCPCS | Performed by: UROLOGY

## 2018-07-24 PROCEDURE — 85610 PROTHROMBIN TIME: CPT | Performed by: ANESTHESIOLOGY

## 2018-07-24 PROCEDURE — C1894 INTRO/SHEATH, NON-LASER: HCPCS | Performed by: UROLOGY

## 2018-07-24 PROCEDURE — 82360 CALCULUS ASSAY QUANT: CPT | Performed by: UROLOGY

## 2018-07-24 PROCEDURE — C1769 GUIDE WIRE: HCPCS | Performed by: UROLOGY

## 2018-07-24 PROCEDURE — 74450 X-RAY URETHRA/BLADDER: CPT

## 2018-07-24 PROCEDURE — 52356 CYSTO/URETERO W/LITHOTRIPSY: CPT | Performed by: UROLOGY

## 2018-07-24 PROCEDURE — 82948 REAGENT STRIP/BLOOD GLUCOSE: CPT

## 2018-07-24 DEVICE — INLAY OPTIMA URETERAL STENT W/O GUIDEWIRE
Type: IMPLANTABLE DEVICE | Site: URETER | Status: FUNCTIONAL
Brand: BARD® INLAY OPTIMA® URETERAL STENT

## 2018-07-24 RX ORDER — HYDROCODONE BITARTRATE AND ACETAMINOPHEN 5; 325 MG/1; MG/1
1 TABLET ORAL EVERY 6 HOURS PRN
Status: DISCONTINUED | OUTPATIENT
Start: 2018-07-24 | End: 2018-07-24 | Stop reason: HOSPADM

## 2018-07-24 RX ORDER — ROCURONIUM BROMIDE 10 MG/ML
INJECTION, SOLUTION INTRAVENOUS AS NEEDED
Status: DISCONTINUED | OUTPATIENT
Start: 2018-07-24 | End: 2018-07-24 | Stop reason: SURG

## 2018-07-24 RX ORDER — FENTANYL CITRATE/PF 50 MCG/ML
25 SYRINGE (ML) INJECTION
Status: DISCONTINUED | OUTPATIENT
Start: 2018-07-24 | End: 2018-07-24 | Stop reason: HOSPADM

## 2018-07-24 RX ORDER — ONDANSETRON 2 MG/ML
4 INJECTION INTRAMUSCULAR; INTRAVENOUS ONCE
Status: DISCONTINUED | OUTPATIENT
Start: 2018-07-24 | End: 2018-07-24 | Stop reason: HOSPADM

## 2018-07-24 RX ORDER — PROPOFOL 10 MG/ML
INJECTION, EMULSION INTRAVENOUS AS NEEDED
Status: DISCONTINUED | OUTPATIENT
Start: 2018-07-24 | End: 2018-07-24 | Stop reason: SURG

## 2018-07-24 RX ORDER — HYDROCODONE BITARTRATE AND ACETAMINOPHEN 5; 325 MG/1; MG/1
1 TABLET ORAL EVERY 4 HOURS PRN
Qty: 5 TABLET | Refills: 0 | Status: SHIPPED | OUTPATIENT
Start: 2018-07-24 | End: 2018-07-26

## 2018-07-24 RX ORDER — METOPROLOL TARTRATE 5 MG/5ML
INJECTION INTRAVENOUS AS NEEDED
Status: DISCONTINUED | OUTPATIENT
Start: 2018-07-24 | End: 2018-07-24 | Stop reason: SURG

## 2018-07-24 RX ORDER — FENTANYL CITRATE 50 UG/ML
INJECTION, SOLUTION INTRAMUSCULAR; INTRAVENOUS AS NEEDED
Status: DISCONTINUED | OUTPATIENT
Start: 2018-07-24 | End: 2018-07-24 | Stop reason: SURG

## 2018-07-24 RX ORDER — LEVOFLOXACIN 500 MG/1
500 TABLET, FILM COATED ORAL EVERY 24 HOURS
Qty: 3 TABLET | Refills: 0 | Status: SHIPPED | OUTPATIENT
Start: 2018-07-24 | End: 2018-07-27

## 2018-07-24 RX ORDER — LEVOFLOXACIN 5 MG/ML
750 INJECTION, SOLUTION INTRAVENOUS ONCE
Status: COMPLETED | OUTPATIENT
Start: 2018-07-24 | End: 2018-07-24

## 2018-07-24 RX ORDER — MIDAZOLAM HYDROCHLORIDE 1 MG/ML
INJECTION INTRAMUSCULAR; INTRAVENOUS AS NEEDED
Status: DISCONTINUED | OUTPATIENT
Start: 2018-07-24 | End: 2018-07-24 | Stop reason: SURG

## 2018-07-24 RX ORDER — GLYCOPYRROLATE 0.2 MG/ML
INJECTION INTRAMUSCULAR; INTRAVENOUS AS NEEDED
Status: DISCONTINUED | OUTPATIENT
Start: 2018-07-24 | End: 2018-07-24 | Stop reason: SURG

## 2018-07-24 RX ORDER — SODIUM CHLORIDE 9 MG/ML
INJECTION, SOLUTION INTRAVENOUS AS NEEDED
Status: DISCONTINUED | OUTPATIENT
Start: 2018-07-24 | End: 2018-07-24 | Stop reason: HOSPADM

## 2018-07-24 RX ORDER — SODIUM CHLORIDE 9 MG/ML
125 INJECTION, SOLUTION INTRAVENOUS CONTINUOUS
Status: DISCONTINUED | OUTPATIENT
Start: 2018-07-24 | End: 2018-07-24 | Stop reason: HOSPADM

## 2018-07-24 RX ORDER — ONDANSETRON 2 MG/ML
INJECTION INTRAMUSCULAR; INTRAVENOUS AS NEEDED
Status: DISCONTINUED | OUTPATIENT
Start: 2018-07-24 | End: 2018-07-24 | Stop reason: SURG

## 2018-07-24 RX ADMIN — METOPROLOL TARTRATE 1 MG: 5 INJECTION INTRAVENOUS at 09:40

## 2018-07-24 RX ADMIN — LIDOCAINE HYDROCHLORIDE 100 MG: 20 INJECTION, SOLUTION INTRAVENOUS at 09:26

## 2018-07-24 RX ADMIN — SODIUM CHLORIDE 125 ML/HR: 0.9 INJECTION, SOLUTION INTRAVENOUS at 13:00

## 2018-07-24 RX ADMIN — METOPROLOL TARTRATE 1 MG: 5 INJECTION INTRAVENOUS at 10:29

## 2018-07-24 RX ADMIN — LIDOCAINE HYDROCHLORIDE 100 MG: 20 INJECTION, SOLUTION INTRAVENOUS at 10:25

## 2018-07-24 RX ADMIN — ROCURONIUM BROMIDE 50 MG: 10 INJECTION INTRAVENOUS at 09:26

## 2018-07-24 RX ADMIN — SODIUM CHLORIDE 125 ML/HR: 0.9 INJECTION, SOLUTION INTRAVENOUS at 07:43

## 2018-07-24 RX ADMIN — ONDANSETRON HYDROCHLORIDE 4 MG: 2 INJECTION, SOLUTION INTRAVENOUS at 09:42

## 2018-07-24 RX ADMIN — NEOSTIGMINE METHYLSULFATE 3 MG: 1 INJECTION, SOLUTION INTRAMUSCULAR; INTRAVENOUS; SUBCUTANEOUS at 10:19

## 2018-07-24 RX ADMIN — GLYCOPYRROLATE 0.6 MG: 0.2 INJECTION, SOLUTION INTRAMUSCULAR; INTRAVENOUS at 10:19

## 2018-07-24 RX ADMIN — PROPOFOL 200 MG: 10 INJECTION, EMULSION INTRAVENOUS at 09:26

## 2018-07-24 RX ADMIN — METOPROLOL TARTRATE 1.5 MG: 5 INJECTION INTRAVENOUS at 09:25

## 2018-07-24 RX ADMIN — FENTANYL CITRATE 25 MCG: 50 INJECTION INTRAMUSCULAR; INTRAVENOUS at 11:00

## 2018-07-24 RX ADMIN — HYDROCODONE BITARTRATE AND ACETAMINOPHEN 1 TABLET: 5; 325 TABLET ORAL at 14:19

## 2018-07-24 RX ADMIN — LEVOFLOXACIN: 5 INJECTION, SOLUTION INTRAVENOUS at 09:21

## 2018-07-24 RX ADMIN — MIDAZOLAM 2 MG: 1 INJECTION INTRAMUSCULAR; INTRAVENOUS at 09:21

## 2018-07-24 RX ADMIN — FENTANYL CITRATE 100 MCG: 50 INJECTION, SOLUTION INTRAMUSCULAR; INTRAVENOUS at 09:25

## 2018-07-24 RX ADMIN — FENTANYL CITRATE 100 MCG: 50 INJECTION, SOLUTION INTRAMUSCULAR; INTRAVENOUS at 09:43

## 2018-07-24 RX ADMIN — SODIUM CHLORIDE: 0.9 INJECTION, SOLUTION INTRAVENOUS at 10:25

## 2018-07-24 NOTE — ANESTHESIA PREPROCEDURE EVALUATION
Review of Systems/Medical History  Patient summary reviewed  Chart reviewed  No history of anesthetic complications     Cardiovascular  EKG reviewed, Hypertension on > 1 medication, Dysrhythmias , atrial fibrillation, DVT  Comment: EKG (7/10)--NSR,  Pulmonary  Smoker cigarette smoker and ex-smoker  Cumulative Pack Years: 60, Sleep apnea CPAP,        GI/Hepatic    GERD well controlled,        Kidney stones,        Endo/Other  Diabetes well controlled type 2 Oral agent,   Obesity    GYN  Negative gynecology ROS          Hematology  Negative hematology ROS      Musculoskeletal  Back pain , lumbar pain,   Arthritis     Neurology  Negative neurology ROS      Psychology   Negative psychology ROS              Physical Exam    Airway  Comment: FULL BEARD  Mallampati score: II  TM Distance: >3 FB  Neck ROM: full     Dental   Comment: MISSING MANY TEETH ON TOP, No notable dental hx     Cardiovascular  Rhythm: regular, Rate: normal, Cardiovascular exam normal    Pulmonary  Pulmonary exam normal Breath sounds clear to auscultation,     Other Findings        Anesthesia Plan  ASA Score- 3     Anesthesia Type- general with ASA Monitors  Additional Monitors:   Airway Plan:         Plan Factors-Patient not instructed to abstain from smoking on day of procedure  Patient did not smoke on day of surgery  Induction- intravenous  Postoperative Plan-     Informed Consent- Anesthetic plan and risks discussed with patient

## 2018-07-24 NOTE — H&P (VIEW-ONLY)
UROLOGY CONSULTATION NOTE     Patient Identifiers: Alvarado Guerrero (MRN [id number])  Service Requesting Consultation: [facility]  Service Providing Consultation:  Urology, Zamzam Perez PA-C    Date of Service: 7/10/2018    Reason for Consultation: UPJ stone    History of Present Illness:     Alvarado Guerrero is a 80 y o  old with a history of urethral stenosis status post dilation at 16years of age presenting to the emergency department due to brown colored urine with some discomfort  Patient states that he noticed brown color urine the other day  He is having intermittent death comfort  Denies any overt flank pain, fevers, chills, dysuria  Did admit to increased urinary urgency and frequency  CT in the emergency department revealed a 9 mm left UPJ calculus  Patient urine also was nitrite positive  Patient is nontoxic and comfortable on examination today  Patient admits to history urethral stenosis which was dilated at age of 16years old  He denies any further urologic history other than a kidney stone approximately 5 years ago which she was able to pass on his own  Patient denies any further genitourinary manipulation  Patient is on warfarin due to a history of atrial fibrillation  Patient does reside in [place]  He is currently visiting [place] due to his father who is in poor health  Patient is unsure how long he will be in [place] depending on his father's health  Past Medical, Past Surgical History:     Past Medical History:   Diagnosis Date    A[facility]fib [facility])     Aortic aneurysm ([facility] [address] )    :    History reviewed   No pertinent surgical history :    Medications, Allergies:     Current Facility-Administered Medications:     acetaminophen (TYLENOL) tablet 650 mg, 650 mg, Oral, Q6H PRN, Yanna Sanches PA-C    insulin lispro (HumaLOG) 100 units/mL subcutaneous injection 1-6 Units, 1-6 Units, Subcutaneous, Q6H LALA **AND** Fingerstick Glucose (POCT), , , Q6H, Yanna Sanches AZAR    levofloxacin (LEVAQUIN) IVPB (premix) 750 mg, 750 mg, Intravenous, Q24H, Andree Carrillo PA-C    metoprolol succinate (TOPROL-XL) 24 hr tablet 25 mg, 25 mg, Oral, Daily, Lora Obando PA-C    ondansetron (ZOFRAN) injection 4 mg, 4 mg, Intravenous, Q6H PRN, Lora Obando PA-C    polyethylene glycol (MIRALAX) packet 17 g, 17 g, Oral, Daily PRN, Lora Obando PA-C    sodium chloride 0 9 % infusion, 125 mL/hr, Intravenous, Continuous, Corry Moses DO, Last Rate: 125 mL/hr at 07/10/18 0534, 125 mL/hr at 07/10/18 0534    valsartan (DIOVAN) tablet 40 mg, 40 mg, Oral, Daily, Lora Obando PA-C    Allergies: Allergies   Allergen Reactions    Penicillins    :    Social and Family History:   Social History:   Social History   Substance Use Topics    Smoking status: Never Smoker    Smokeless tobacco: Never Used    Alcohol use Yes      Comment: occ     History   Smoking Status    Never Smoker   Smokeless Tobacco    Never Used       Family History:  History reviewed  No pertinent family history :     Review of Systems:     General: Fever, chills, or night sweats: negative  Cardiac: Negative for chest pain  Pulmonary: Negative for shortness of breath  Gastrointestinal: Abdominal pain positive  Nausea, vomiting, or diarrhea negative,  Genitourinary: See HPI above  Patient does not have hematuria  All other systems queried were negative  Physical Exam:   General: Patient is pleasant and in NAD  Awake and alert  /63 (BP Location: Left arm)   Pulse 82   Temp 98 4 °F (36 9 °C) (Oral)   Resp 18   Ht 5' 9 76" (1 772 m)   SpO2 93%   BMI 34 47 kg/m²   Cardiac: Peripheral edema: negative  Pulmonary: Non-labored breathing  Abdomen: Soft, non-tender, non-distended  No surgical scars  No masses, tenderness, hernias noted  Genitourinary: Negative CVA tenderness, negative suprapubic tenderness  BAE: none   Urine chapis colored in urinal      Labs:     Lab Results Component Value Date    HGB 15 2 07/10/2018    HCT 44 3 07/10/2018    WBC 6 71 07/10/2018     07/10/2018   ]    Lab Results   Component Value Date     07/10/2018    K 4 4 07/10/2018     07/10/2018    CO2 21 07/10/2018    BUN 22 07/10/2018    CREATININE 1 08 07/10/2018    CALCIUM 7 9 (L) 07/10/2018    GLUCOSE 154 (H) 07/10/2018   ]    Imaging:   I personally reviewed the images and report of the following studies, and reviewed them with the patient:  CT ABDOMEN AND PELVIS WITH IV CONTRAST     INDICATION:   painless hematuria      COMPARISON: None      TECHNIQUE:  CT examination of the abdomen and pelvis was performed  Axial, sagittal, and coronal 2D reformatted images were created from the source data and submitted for interpretation      Radiation dose length product (DLP) for this visit:  1054 mGy-cm   This examination, like all CT scans performed in the [facility], was performed utilizing techniques to minimize radiation dose exposure, including the use of iterative   reconstruction and automated exposure control      IV Contrast:  100 mL of iohexol (OMNIPAQUE)  350  Enteric Contrast:  Enteric contrast was not administered      FINDINGS:     ABDOMEN     LOWER CHEST:  Atelectatic changes both lung bases  Cardiac size within normal limits  Coronary atherosclerosis  Valvular calcifications  No significant pericardial effusion  Ascending thoracic aorta measures 4 5 cm which is aneurysmal by size   criteria      LIVER/BILIARY TREE:  Fatty infiltration of liver  No intrahepatic biliary ductal dilatation  No obvious hepatic mass     GALLBLADDER:  Calcified gallstone in the gallbladder neck  No pericholecystic inflammatory changes or fluid      SPLEEN:  Unremarkable      PANCREAS:  Diffuse fatty atrophy      ADRENAL GLANDS:  Unremarkable      KIDNEYS/URETERS:    Right kidney: Numerous small cystic lesions    No hydronephrosis or renal colic      Left kidney: Exophytic cyst upper pole measuring 1 7 cm  Additional much smaller cortical low-attenuation lesions which are likely cysts but are too small to fully characterize  Nonobstructing lower pole collecting system stone measuring 10 mm  Ovoid   calculus in the UPJ measuring 9 mm in length by 6 mm x 3 mm diameter causing mild hydronephrosis and a slightly delayed left-sided nephrogram compared to the right side  The ureter below this level is decompressed      STOMACH AND BOWEL:  Stomach is partially collapsed and otherwise was unremarkable  The small bowel appears average caliber without evidence of obstruction  The right colon contains a small amount of residual stool  Transverse colon is mostly empty  Descending colon is mostly collapsed  Diffuse colonic diverticulosis without evidence of diverticulitis      APPENDIX:  No findings to suggest appendicitis      ABDOMINOPELVIC CAVITY: No ascites  No free air  No adenopathy      VESSELS:  Calcified atherosclerotic plaque  Infrarenal abdominal aortic ectasia at the level of the MICHAEL measuring 2 8 cm      PELVIS     REPRODUCTIVE ORGANS:  Unremarkable for patient's age      URINARY BLADDER:  Unremarkable      ABDOMINAL WALL/INGUINAL REGIONS:  Tiny fat-containing left inguinal hernia  Tiny fat-containing umbilical hernia      OSSEOUS STRUCTURES:  Multilevel fusion of the lumbar spine from L1 through L5  Exaggerated degenerative changes at T12-L1 and L5-S1    There is a fluid attenuation structure expanding the sacral spinal canal, likely a large synovial cyst           IMPRESSION:  Ascending thoracic aortic aneurysm (2 5 cm)     Fatty infiltration of liver      Cholelithiasis without CT evidence of cholecystitis      Diffuse fatty atrophy of pancreas      Bilateral renal cortical cystic lesions, many of which are too small to accurately characterize      Left side renal collecting system stones and a 9 x 6 x 3 mm stone in the left UPJ causing at least partial obstruction with a delayed nephrogram and hydronephrosis  Urologic consultation recommended      Limited evaluation of GI tract without oral contrast   No obstruction or perforation  Colonic diverticulosis without evidence of diverticulitis      Ectasia of the abdominal aorta at the level of the MICHAEL (2 8 cm)      Multilevel fusion of lumbar spine      There is likely a large synovial cyst in the sacral spinal canal   Nonemergent MRI correlation recommended, especially if there are neurologic symptoms  ASSESSMENT:     9mm left UPJ calculus    Urinary infection    PLAN:     I reviewed with the patient's his 9 mm obstructing stone  We discussed given the setting of urinary infection, recommendations are for cystoscopy, retrograde pyelogram, and ureteral stent for renal decompression and treatment with antibiosis  This require a secondary procedure of cystoscopy, ureteroscopy, holmium laser, basket extraction, retrograde pyelogram, ureteral stent once patient has been treated with a course of antibiosis  Patient understands that he will need a secondary surgery and that we strongly recommend him to remain in [place] until this is addressed  I reviewed with the patient the risks of the procedure including but not limited to cardiopulmonary complications, VTE, bleeding, infection, damage to nearby structures, need for additional procedures, stent pain, and ureteral stricture  Patient verbalized understanding  These will be reviewed by the surgeon as well  Patient is tentatively scheduled for cystoscopy, retrograde pyelogram, and left ureteral stent insertion this morning  He has been NPO since midnight  I will have my office contact him for outpatient follow-up to arrange for his secondary surgery  Thank you for allowing me to participate in this patients care  Please do not hesitate to call with any additional questions    Dimas Jennings PA-C

## 2018-07-24 NOTE — OP NOTE
OPERATIVE REPORT  PATIENT NAME: Tricia Barnes    :  1950  MRN: [id number]  Pt Location: [facility] OR ROOM 07    SURGERY DATE: 2018    Surgeon(s) and Role:     Jus Malhotra MD - Primary    Preop Diagnosis:  Nephrolithiasis [N20 0]    Post-Op Diagnosis Codes:     * Nephrolithiasis [N20 0]    Procedure(s) (LRB):  CYSTOSCOPY URETEROSCOPY WITH LITHOTRIPSY HOLMIUM LASER, RETROGRADE PYELOGRAM AND LEFT URETERAL STENT EXCHANGE (Left)    Specimen(s):  ID Type Source Tests Collected by Time Destination   A : Left Ureter stone Calculus Ureter, Left STONE ANALYSIS Porfirio Vallejo MD 2018 0912        Estimated Blood Loss:   Minimal    Drains:  Ureteral Drain/Stent Left ureter 6 Fr  (Active)   Number of days: 0       Anesthesia Type:   General    Operative Indications:  Nephrolithiasis [N20 0]      Operative Findings:  2 large left renal calculi, completely fragmented  Largest fragment retrieved, measuring about 3 mm  Complications:   None    Procedure and Technique:  The patient was identified, brought to the operating room, and placed on the table in supine position  After induction of general anesthesia, the patient was placed in dorsal lithotomy position and prepped and draped in the usual sterile fashion  A complete formal timeout was performed  The 25 [place] rigid cystoscope was placed per urethra and cystoscopy was performed  There was no bladder abnormality identified  The Left ureteral stent was identified and grasped with a grasper, brought out through the meatus, and cannulated with a sensor wire  A dual-lumen catheter was placed followed by a second guidewire and ureteral access sheath  The 8 5 [place] flexible ureteroscope was placed and 2 stones were identified, one within a mid pole renal calyx and the other in the lower pole     A holmium laser fiber was placed and laser lithotripsy was performed  Both stones were completely dusted    A zero tip stone basket was placed and the largest stone fragments were retrieved  At this point, a retrograde pyelogram was performed delineating the upper urinary tract anatomy  No further filling defect identified  The safety wire was backloaded into the cystoscope and a 26 cm x 6 Citizen of the [place] double-J stent was placed with string  The patient tolerated the procedure well and was transferred to the recovery room awake alert and in stable condition       I was present for the entire procedure    Patient Disposition:  PACU     SIGNATURE: Elaine Duarte MD  DATE: July 24, 2018  TIME: 10:24 AM

## 2018-07-24 NOTE — INTERVAL H&P NOTE
H&P reviewed  After examining the patient I find no changes in the patients condition since the H&P had been written  Patient now presents for 2nd stage ureteroscopy  Procedure, risks, and benefits discussed  Consent obtained for left ureteroscopy, laser, stone extraction, stent  All questions answered to their satisfaction

## 2018-07-25 ENCOUNTER — TELEPHONE (OUTPATIENT)
Dept: UROLOGY | Facility: AMBULATORY SURGERY CENTER | Age: 68
End: 2018-07-25

## 2018-07-25 NOTE — TELEPHONE ENCOUNTER
Patient had CYSTOSCOPY URETEROSCOPY WITH LITHOTRIPSY HOLMIUM LASER, RETROGRADE PYELOGRAM AND LEFT URETERAL STENT EXCHANGE done with Dr Frankey Gulling yesterday on 7/24/18  Per Dr Frankey Gulling, stent with string to be removed on Friday, 7/27/18  Appointment scheduled in [place] office at 8:00 on 7/27/18  Patient reports some urinary incontinence  Reports stent in place  Instructed patient urinary symptoms such as frequency, urgency are common with stent

## 2018-07-26 ENCOUNTER — APPOINTMENT (EMERGENCY)
Dept: ULTRASOUND IMAGING | Facility: HOSPITAL | Age: 68
End: 2018-07-26
Payer: MEDICARE

## 2018-07-26 ENCOUNTER — HOSPITAL ENCOUNTER (EMERGENCY)
Facility: HOSPITAL | Age: 68
Discharge: HOME | End: 2018-07-26
Attending: EMERGENCY MEDICINE
Payer: MEDICARE

## 2018-07-26 VITALS
HEART RATE: 81 BPM | TEMPERATURE: 99 F | RESPIRATION RATE: 18 BRPM | DIASTOLIC BLOOD PRESSURE: 88 MMHG | OXYGEN SATURATION: 94 % | SYSTOLIC BLOOD PRESSURE: 184 MMHG

## 2018-07-26 DIAGNOSIS — R60.0 BILATERAL LOWER EXTREMITY EDEMA: Primary | ICD-10-CM

## 2018-07-26 LAB
ANION GAP SERPL CALCULATED.3IONS-SCNC: 5 MMOL/L (ref 4–13)
BUN SERPL-MCNC: 11 MG/DL (ref 5–25)
CALCIUM SERPL-MCNC: 8.7 MG/DL (ref 8.3–10.1)
CHLORIDE SERPL-SCNC: 105 MMOL/L (ref 100–108)
CO2 SERPL-SCNC: 28 MMOL/L (ref 21–32)
CREAT SERPL-MCNC: 1.17 MG/DL (ref 0.6–1.3)
GFR SERPL CREATININE-BSD FRML MDRD: 64 ML/MIN/1.73SQ M
GLUCOSE SERPL-MCNC: 188 MG/DL (ref 65–140)
INR PPP: 1.4 (ref 0.86–1.17)
POTASSIUM SERPL-SCNC: 4.2 MMOL/L (ref 3.5–5.3)
PROTHROMBIN TIME: 16.7 SECONDS (ref 11.8–14.2)
SODIUM SERPL-SCNC: 138 MMOL/L (ref 136–145)

## 2018-07-26 PROCEDURE — 93971 EXTREMITY STUDY: CPT | Performed by: SURGERY

## 2018-07-26 PROCEDURE — 36415 COLL VENOUS BLD VENIPUNCTURE: CPT | Performed by: EMERGENCY MEDICINE

## 2018-07-26 PROCEDURE — 99284 EMERGENCY DEPT VISIT MOD MDM: CPT

## 2018-07-26 PROCEDURE — 93971 EXTREMITY STUDY: CPT

## 2018-07-26 PROCEDURE — 80048 BASIC METABOLIC PNL TOTAL CA: CPT | Performed by: EMERGENCY MEDICINE

## 2018-07-26 PROCEDURE — 85610 PROTHROMBIN TIME: CPT | Performed by: EMERGENCY MEDICINE

## 2018-07-26 NOTE — ED PROVIDER NOTES
History  Chief Complaint   Patient presents with    Leg Swelling     pt c/o left leg swelling which he noticed this morning and believes is a dvt  recent urology procedure and inpatient x1 night  pt on coumadin for afib  80 yr male with hx of paf/ vte- on coumadin - recently topped coumadin and resartted 2 days ago for urologic procedure-- c/o ble welling left greater than right-- no cp/so- no pnd/ orthopnea- normal amount of urination no other comps        History provided by:  Patient and relative   used: No        Prior to Admission Medications   Prescriptions Last Dose Informant Patient Reported?  Taking?   glipiZIDE (GLUCOTROL) 5 mg tablet   Yes Yes   Sig: Take 5 mg by mouth every morning     levofloxacin (LEVAQUIN) 500 mg tablet   No Yes   Sig: Take 1 tablet (500 mg total) by mouth every 24 hours for 3 days   metoprolol succinate (TOPROL-XL) 25 mg 24 hr tablet   Yes Yes   Sig: Take 25 mg by mouth daily at bedtime     omeprazole (PriLOSEC) 20 mg delayed release capsule   Yes Yes   Sig: Take 20 mg by mouth every morning   oxybutynin (DITROPAN) 5 mg tablet   No Yes   Sig: Take 1 tablet (5 mg total) by mouth 2 (two) times a day for 90 days   tamsulosin (FLOMAX) 0 4 mg   No Yes   Sig: Take 1 capsule (0 4 mg total) by mouth daily with dinner   warfarin (COUMADIN) 10 mg tablet   Yes Yes   Sig: Take 10 mg by mouth 2 (two) times a week Tuesday and Thursday    warfarin (COUMADIN) 5 mg tablet   Yes Yes   Sig: Take 5 mg by mouth 4 (four) times a week Takes on all other days when not taking 10 mg       Facility-Administered Medications: None       Past Medical History:   Diagnosis Date    [facility]     AAA (abdominal aortic aneurysm) (HCC)     Aortic aneurysm (HCC)     Arthritis     Atrial fibrillation (HCC)     Back pain     Occasional    CPAP (continuous positive airway pressure) dependence     DDD (degenerative disc disease), lumbar     Diabetes mellitus (HCC)     Type II, NIDDM    GERD (gastroesophageal reflux disease)     Hearing aid worn     Bilateral    History of blood clots     Left leg- approximately 10 years ago after fracture in foot   History of colon polyps     [place] (hard of hearing)     Hypertension     Kidney stone     Sleep apnea     Wears glasses        Past Surgical History:   Procedure Laterality Date    COLONOSCOPY      CYSTOSCOPY      ESOPHAGOGASTRODUODENOSCOPY      HERNIA REPAIR Bilateral     Inguinal    HI CYSTO/URETERO W/LITHOTRIPSY &INDWELL STENT INSRT Left 7/24/2018    Procedure: CYSTOSCOPY URETEROSCOPY WITH LITHOTRIPSY HOLMIUM LASER, RETROGRADE PYELOGRAM AND LEFT URETERAL STENT EXCHANGE;  Surgeon: Sterling Carrero MD;  Location: [facility];  Service: Urology    HI CYSTOURETHROSCOPY,URETER CATHETER Left 7/10/2018    Procedure: CYSTOSCOPY RETROGRADE PYELOGRAM WITH INSERTION STENT URETERAL;  Surgeon: Hamlet Allred MD;  Location[facility];  Service: Urology    TONSILLECTOMY         History reviewed  No pertinent family history  I have reviewed and agree with the history as documented  Social History   Substance Use Topics    Smoking status: Former Smoker     Packs/day: 3 00     Types: Cigarettes    Smokeless tobacco: Never Used      Comment: Quit 25 years ago  Started at age 7   Alcohol use Yes      Comment: 4 or 5 weekly        Review of Systems   Constitutional: Negative  HENT: Negative  Eyes: Negative  Respiratory: Negative  Cardiovascular: Positive for leg swelling  Negative for chest pain and palpitations  Gastrointestinal: Negative  Endocrine: Negative  Genitourinary: Negative  Musculoskeletal: Negative  Skin: Negative  Allergic/Immunologic: Negative  Neurological: Negative  Hematological: Negative  Psychiatric/Behavioral: Negative  Physical Exam  Physical Exam   Constitutional: He is oriented to person, place, and time  He appears well-developed and well-nourished  No distress     avss- pulse ox 93 % on ra- interpretation is low- normal- no intervention   HENT:   Head: Normocephalic and atraumatic  Eyes: Conjunctivae and EOM are normal  Pupils are equal, round, and reactive to light  Right eye exhibits no discharge  Left eye exhibits no discharge  No scleral icterus  Mm pink   Neck: Normal range of motion  Neck supple  No JVD present  No tracheal deviation present  No thyromegaly present  Cardiovascular: Normal rate, regular rhythm, normal heart sounds and intact distal pulses  Exam reveals no gallop and no friction rub  No murmur heard  Pulmonary/Chest: Effort normal and breath sounds normal  No stridor  No respiratory distress  He has no wheezes  He has no rales  He exhibits no tenderness  Abdominal: Soft  Bowel sounds are normal  He exhibits no distension and no mass  There is no tenderness  There is no rebound and no guarding  No hernia  Musculoskeletal: Normal range of motion  He exhibits edema  He exhibits no tenderness or deformity  1 plus ble pretibial edema-- lle mildly greater than right-- no erythema-- equal bilateral radial/dp pulses   Lymphadenopathy:     He has no cervical adenopathy  Neurological: He is alert and oriented to person, place, and time  No cranial nerve deficit or sensory deficit  He exhibits normal muscle tone  Coordination normal    Skin: Skin is warm  Capillary refill takes less than 2 seconds  No rash noted  He is not diaphoretic  No erythema  No pallor  Psychiatric: He has a normal mood and affect  His behavior is normal  Judgment and thought content normal    Nursing note and vitals reviewed        Vital Signs  ED Triage Vitals   Temperature Pulse Respirations Blood Pressure SpO2   07/26/18 1554 07/26/18 1540 07/26/18 1540 07/26/18 1540 07/26/18 1540   99 °F (37 2 °C) 67 18 149/70 93 %      Temp Source Heart Rate Source Patient Position - Orthostatic VS BP Location FiO2 (%)   07/26/18 1554 07/26/18 1540 -- -- --   Oral Monitor         Pain Score --                  Vitals:    07/26/18 1540   BP: 149/70   Pulse: 67       Visual Acuity      ED Medications  Medications - No data to display    Diagnostic Studies  Results Reviewed     Procedure Component Value Units Date/Time    Protime-INR [[id number]]  (Abnormal) Collected:  07/26/18 1711    Lab Status:  Final result Specimen:  Blood from Arm, Left Updated:  07/26/18 1729     Protime 16 7 (H) seconds      INR 1 40 (H)    Basic metabolic panel [[id number]]  (Abnormal) Collected:  07/26/18 1603    Lab Status:  Final result Specimen:  Blood from Arm, Right Updated:  07/26/18 1618     Sodium 138 mmol/L      Potassium 4 2 mmol/L      Chloride 105 mmol/L      CO2 28 mmol/L      Anion Gap 5 mmol/L      BUN 11 mg/dL      Creatinine 1 17 mg/dL      Glucose 188 (H) mg/dL      Calcium 8 7 mg/dL      eGFR 64 ml/min/1 73sq m     Narrative:         National Kidney Disease Education Program recommendations are as follows:  GFR calculation is accurate only with a steady state creatinine  Chronic Kidney disease less than 60 ml/min/1 73 sq  meters  Kidney failure less than 15 ml/min/1 73 sq  meters  VAS lower limb venous duplex study, unilateral/limited    (Results Pending)              Procedures  Procedures       Phone Contacts  ED Phone Contact    ED Course  ED Course as of Jul 27 1346   Thu Jul 26, 2018   1545 ER MD NOTE- 7/10/18 LABS AND 7/24/18 INR REVIEWED BY ER MD     1625 RLE    1626 LLE DOPPLER U/S- NEG FOR PROX/CALF DVT    1749 - ER MD NOTE- PT JUST STARTED COUMADIN UP YESTERDAY AFTER URETERAL STENT Placement                                [facility] Time    Disposition  Final diagnoses:   None     ED Disposition     None      Follow-up Information    None         Patient's Medications   Discharge Prescriptions    No medications on file     No discharge procedures on file      ED Provider  Electronically Signed by           Emily Byrd MD  07/27/18 6236

## 2018-07-26 NOTE — DISCHARGE INSTRUCTIONS
Diagnosis; bilateral lower extremity edema-- left greater than right    - would go to medical supply pharmacy- and get lower extremity compression stockings- find something that you are comfortable with and wear during the day and take off at night    -starting tomorrow- would take coumadin 10 mg a day - and go to outpatient lab on Monday to have a repeat  Coumadin level drawn -- on Monday would go back to 5 mg a day until you get the level - when get the level- call your doctor in [address] to get further instructions

## 2018-07-26 NOTE — ED NOTES
PT awake and alert, walked to waiting room with PT, no distress noted  No other questions upon d/c       April Fariha Stewart RN  07/26/18 7372

## 2018-07-27 ENCOUNTER — TELEPHONE (OUTPATIENT)
Dept: UROLOGY | Facility: AMBULATORY SURGERY CENTER | Age: 68
End: 2018-07-27

## 2018-07-27 ENCOUNTER — CLINICAL SUPPORT (OUTPATIENT)
Dept: UROLOGY | Facility: AMBULATORY SURGERY CENTER | Age: 68
End: 2018-07-27
Payer: MEDICARE

## 2018-07-27 VITALS
BODY MASS INDEX: 34.67 KG/M2 | HEART RATE: 78 BPM | SYSTOLIC BLOOD PRESSURE: 162 MMHG | DIASTOLIC BLOOD PRESSURE: 90 MMHG | WEIGHT: 242.2 LBS | HEIGHT: 70 IN

## 2018-07-27 DIAGNOSIS — N20.0 NEPHROLITHIASIS: Primary | ICD-10-CM

## 2018-07-27 PROCEDURE — 99211 OFF/OP EST MAY X REQ PHY/QHP: CPT

## 2018-07-27 RX ORDER — LOSARTAN POTASSIUM 25 MG/1
25 TABLET ORAL
COMMUNITY
Start: 2018-05-23

## 2018-07-27 NOTE — TELEPHONE ENCOUNTER
Patient seen in our practice for recent kidney stone surgery and follow up  Patient resides permanently in [place], was in area for family emergency  Patient requests any records be faxed to his PCP  Please send records to [facility] in [facility] , fax number is [phone]  Address, [address]  Phone # [phone]

## 2018-07-27 NOTE — PROGRESS NOTES
Era Organ  [id number]  1950 7/27/2018      Diagnosis  Chief Complaint     Nephrolithiasis; Stent with String Removal          Patient is s/p left ureteroscopy with stone extraction on 7/24/18 with Dr Livan Horn  Follow up in 4-6 weeks with [facility] prior to appointment  Patient advised to follow up with urologist closer to home  Procedure Stent with String Removal    Vitals:    07/27/18 0759   BP: 162/90   Pulse: 78   Weight: 110 kg (242 lb 3 2 oz)   Height: 5' 9 5" (1 765 m)       Stent with string removed without difficulty  Reviewed post stent removal symptoms including flank pain, nausea, dysuria, and hematuria  Instructed to increase PO fluids  Take NSAIDS and other prescribed pain medication PRN  Encouraged to call with for uncontrolled pain and fever  Patient in town due to family emergency  Patient resides permanently in [place]  Patient reports he is traveling home next week  Patient advised to call our office in the meantime with any questions or concerns  Patient also advised to call his PCP for Urologist recommendation  Patient verbalized understanding  Patient to follow up with urologist in [place] closer to his home         Rita Hansen RN

## 2018-07-31 LAB
CA PHOS MFR STONE: 3 %
CALCIUM OXALATE DIHYDRATE MFR STONE IR: 5 %
COLOR STONE: NORMAL
COM MFR STONE: 12 %
COMMENT-STONE3: NORMAL
COMPOSITION: NORMAL
LABORATORY COMMENT REPORT: NORMAL
NIDUS STONE QL: NORMAL
PHOTO: NORMAL
SIZE STONE: NORMAL MM
STONE ANALYSIS-IMP: NORMAL
URATE MFR STONE: 80 %
WT STONE: 10.6 MG

## (undated) DEVICE — INVIEW CLEAR LEGGINGS: Brand: CONVERTORS

## (undated) DEVICE — GLOVE INDICATOR PI UNDERGLOVE SZ 8 BLUE

## (undated) DEVICE — GLOVE SRG BIOGEL 7.5

## (undated) DEVICE — PACK TUR

## (undated) DEVICE — 3000CC GUARDIAN II: Brand: GUARDIAN

## (undated) DEVICE — CATH URET .038 10FR 50CM DUAL LUMEN

## (undated) DEVICE — GUIDEWIRE STRGHT TIP 0.035 IN  SOLO PLUS

## (undated) DEVICE — BASKET SPECIMEN RETRIVAL 1.9FR 120CM

## (undated) DEVICE — SHEATH URETERAL ACCESS 10/12FR 45CM PROXIS

## (undated) DEVICE — UROCATCH BAG

## (undated) DEVICE — CATH URETERAL 5FR X 70 CM FLEX TIP POLYUR BARD

## (undated) DEVICE — SCD SEQUENTIAL COMPRESSION COMFORT SLEEVE MEDIUM KNEE LENGTH: Brand: KENDALL SCD

## (undated) DEVICE — STERILE SURGICAL LUBRICANT,  TUBE: Brand: SURGILUBE

## (undated) DEVICE — TUBING SUCTION 5MM X 12 FT

## (undated) DEVICE — SPECIMEN CONTAINER STERILE PEEL PACK

## (undated) DEVICE — GLOVE SRG BIOGEL ECLIPSE 7.5

## (undated) DEVICE — 3M™ TEGADERM™ TRANSPARENT FILM DRESSING FRAME STYLE, 1624W, 2-3/8 IN X 2-3/4 IN (6 CM X 7 CM), 100/CT 4CT/CASE: Brand: 3M™ TEGADERM™

## (undated) DEVICE — LASER HOLMIUM FIBER 365 MIC